# Patient Record
Sex: MALE | Race: WHITE | Employment: OTHER
[De-identification: names, ages, dates, MRNs, and addresses within clinical notes are randomized per-mention and may not be internally consistent; named-entity substitution may affect disease eponyms.]

---

## 2017-03-15 ENCOUNTER — TELEPHONE (OUTPATIENT)
Dept: CASE MANAGEMENT | Age: 67
End: 2017-03-15

## 2017-04-25 ENCOUNTER — TELEPHONE (OUTPATIENT)
Dept: CASE MANAGEMENT | Age: 67
End: 2017-04-25

## 2017-08-02 ENCOUNTER — TELEPHONE (OUTPATIENT)
Dept: CASE MANAGEMENT | Age: 67
End: 2017-08-02

## 2021-09-29 ENCOUNTER — HOSPITAL ENCOUNTER (INPATIENT)
Age: 71
LOS: 4 days | Discharge: HOME | DRG: 897 | End: 2021-10-04
Attending: EMERGENCY MEDICINE | Admitting: INTERNAL MEDICINE
Payer: MEDICARE

## 2021-09-29 ENCOUNTER — APPOINTMENT (OUTPATIENT)
Dept: CT IMAGING | Age: 71
DRG: 897 | End: 2021-09-29
Payer: MEDICARE

## 2021-09-29 ENCOUNTER — APPOINTMENT (OUTPATIENT)
Dept: GENERAL RADIOLOGY | Age: 71
DRG: 897 | End: 2021-09-29
Payer: MEDICARE

## 2021-09-29 DIAGNOSIS — S01.01XA LACERATION OF SCALP, INITIAL ENCOUNTER: ICD-10-CM

## 2021-09-29 DIAGNOSIS — E86.0 DEHYDRATION: ICD-10-CM

## 2021-09-29 DIAGNOSIS — M62.82 NON-TRAUMATIC RHABDOMYOLYSIS: Primary | ICD-10-CM

## 2021-09-29 LAB
ALBUMIN SERPL-MCNC: 3.7 G/DL (ref 3.4–5)
ALP BLD-CCNC: 134 U/L (ref 40–129)
ALT SERPL-CCNC: 44 U/L (ref 10–40)
ANION GAP SERPL CALCULATED.3IONS-SCNC: 21 MMOL/L (ref 3–16)
AST SERPL-CCNC: 101 U/L (ref 15–37)
BASOPHILS ABSOLUTE: 0 K/UL (ref 0–0.2)
BASOPHILS RELATIVE PERCENT: 0.1 %
BILIRUB SERPL-MCNC: 0.6 MG/DL (ref 0–1)
BILIRUBIN DIRECT: <0.2 MG/DL (ref 0–0.3)
BILIRUBIN, INDIRECT: ABNORMAL MG/DL (ref 0–1)
BUN BLDV-MCNC: 15 MG/DL (ref 7–20)
CALCIUM SERPL-MCNC: 9 MG/DL (ref 8.3–10.6)
CHLORIDE BLD-SCNC: 91 MMOL/L (ref 99–110)
CO2: 19 MMOL/L (ref 21–32)
CREAT SERPL-MCNC: 0.8 MG/DL (ref 0.8–1.3)
EOSINOPHILS ABSOLUTE: 0 K/UL (ref 0–0.6)
EOSINOPHILS RELATIVE PERCENT: 0.1 %
ETHANOL: NORMAL MG/DL (ref 0–0.08)
GFR AFRICAN AMERICAN: >60
GFR NON-AFRICAN AMERICAN: >60
GLUCOSE BLD-MCNC: 68 MG/DL (ref 70–99)
HCT VFR BLD CALC: 43.3 % (ref 40.5–52.5)
HEMOGLOBIN: 14.8 G/DL (ref 13.5–17.5)
INR BLD: 1.2 (ref 0.88–1.12)
LYMPHOCYTES ABSOLUTE: 0.5 K/UL (ref 1–5.1)
LYMPHOCYTES RELATIVE PERCENT: 3.3 %
MCH RBC QN AUTO: 34.3 PG (ref 26–34)
MCHC RBC AUTO-ENTMCNC: 34.2 G/DL (ref 31–36)
MCV RBC AUTO: 100 FL (ref 80–100)
MONOCYTES ABSOLUTE: 0.8 K/UL (ref 0–1.3)
MONOCYTES RELATIVE PERCENT: 4.8 %
NEUTROPHILS ABSOLUTE: 14.5 K/UL (ref 1.7–7.7)
NEUTROPHILS RELATIVE PERCENT: 91.7 %
PDW BLD-RTO: 13.1 % (ref 12.4–15.4)
PLATELET # BLD: 268 K/UL (ref 135–450)
PMV BLD AUTO: 7.2 FL (ref 5–10.5)
POTASSIUM REFLEX MAGNESIUM: 4.1 MMOL/L (ref 3.5–5.1)
PROTHROMBIN TIME: 13.7 SEC (ref 9.9–12.7)
RBC # BLD: 4.33 M/UL (ref 4.2–5.9)
SODIUM BLD-SCNC: 131 MMOL/L (ref 136–145)
TOTAL PROTEIN: 7 G/DL (ref 6.4–8.2)
WBC # BLD: 15.8 K/UL (ref 4–11)

## 2021-09-29 PROCEDURE — 36415 COLL VENOUS BLD VENIPUNCTURE: CPT

## 2021-09-29 PROCEDURE — 96360 HYDRATION IV INFUSION INIT: CPT

## 2021-09-29 PROCEDURE — 85025 COMPLETE CBC W/AUTO DIFF WBC: CPT

## 2021-09-29 PROCEDURE — 70450 CT HEAD/BRAIN W/O DYE: CPT

## 2021-09-29 PROCEDURE — 6360000002 HC RX W HCPCS: Performed by: PHYSICIAN ASSISTANT

## 2021-09-29 PROCEDURE — 72125 CT NECK SPINE W/O DYE: CPT

## 2021-09-29 PROCEDURE — 85610 PROTHROMBIN TIME: CPT

## 2021-09-29 PROCEDURE — 82550 ASSAY OF CK (CPK): CPT

## 2021-09-29 PROCEDURE — 80048 BASIC METABOLIC PNL TOTAL CA: CPT

## 2021-09-29 PROCEDURE — 2580000003 HC RX 258: Performed by: PHYSICIAN ASSISTANT

## 2021-09-29 PROCEDURE — 90471 IMMUNIZATION ADMIN: CPT | Performed by: PHYSICIAN ASSISTANT

## 2021-09-29 PROCEDURE — 96361 HYDRATE IV INFUSION ADD-ON: CPT

## 2021-09-29 PROCEDURE — 80076 HEPATIC FUNCTION PANEL: CPT

## 2021-09-29 PROCEDURE — 90715 TDAP VACCINE 7 YRS/> IM: CPT | Performed by: PHYSICIAN ASSISTANT

## 2021-09-29 PROCEDURE — 71046 X-RAY EXAM CHEST 2 VIEWS: CPT

## 2021-09-29 PROCEDURE — 82077 ASSAY SPEC XCP UR&BREATH IA: CPT

## 2021-09-29 RX ORDER — LIDOCAINE HYDROCHLORIDE AND EPINEPHRINE 10; 10 MG/ML; UG/ML
20 INJECTION, SOLUTION INFILTRATION; PERINEURAL ONCE
Status: COMPLETED | OUTPATIENT
Start: 2021-09-29 | End: 2021-09-30

## 2021-09-29 RX ORDER — SODIUM CHLORIDE, SODIUM LACTATE, POTASSIUM CHLORIDE, CALCIUM CHLORIDE 600; 310; 30; 20 MG/100ML; MG/100ML; MG/100ML; MG/100ML
1000 INJECTION, SOLUTION INTRAVENOUS ONCE
Status: COMPLETED | OUTPATIENT
Start: 2021-09-29 | End: 2021-09-30

## 2021-09-29 RX ADMIN — SODIUM CHLORIDE, POTASSIUM CHLORIDE, SODIUM LACTATE AND CALCIUM CHLORIDE 1000 ML: 600; 310; 30; 20 INJECTION, SOLUTION INTRAVENOUS at 22:58

## 2021-09-29 RX ADMIN — TETANUS TOXOID, REDUCED DIPHTHERIA TOXOID AND ACELLULAR PERTUSSIS VACCINE, ADSORBED 0.5 ML: 5; 2.5; 8; 8; 2.5 SUSPENSION INTRAMUSCULAR at 22:59

## 2021-09-30 PROBLEM — R41.82 AMS (ALTERED MENTAL STATUS): Status: ACTIVE | Noted: 2021-09-30

## 2021-09-30 LAB
BACTERIA: ABNORMAL /HPF
BILIRUBIN URINE: ABNORMAL
BLOOD, URINE: ABNORMAL
CLARITY: CLEAR
COLOR: YELLOW
GLUCOSE BLD-MCNC: 197 MG/DL (ref 70–99)
GLUCOSE URINE: NEGATIVE MG/DL
KETONES, URINE: >=80 MG/DL
LEUKOCYTE ESTERASE, URINE: ABNORMAL
MICROSCOPIC EXAMINATION: YES
NITRITE, URINE: NEGATIVE
PERFORMED ON: ABNORMAL
PH UA: 6 (ref 5–8)
PROTEIN UA: 30 MG/DL
RBC UA: ABNORMAL /HPF (ref 0–4)
SPECIFIC GRAVITY UA: >=1.03 (ref 1–1.03)
TOTAL CK: 1182 U/L (ref 39–308)
TOTAL CK: 1608 U/L (ref 39–308)
TOTAL CK: 2883 U/L (ref 39–308)
TOTAL CK: 886 U/L (ref 39–308)
URINE TYPE: ABNORMAL
UROBILINOGEN, URINE: 1 E.U./DL
WBC UA: ABNORMAL /HPF (ref 0–5)

## 2021-09-30 PROCEDURE — 94640 AIRWAY INHALATION TREATMENT: CPT

## 2021-09-30 PROCEDURE — 6360000002 HC RX W HCPCS: Performed by: PHYSICIAN ASSISTANT

## 2021-09-30 PROCEDURE — 6370000000 HC RX 637 (ALT 250 FOR IP): Performed by: INTERNAL MEDICINE

## 2021-09-30 PROCEDURE — 81003 URINALYSIS AUTO W/O SCOPE: CPT

## 2021-09-30 PROCEDURE — 87086 URINE CULTURE/COLONY COUNT: CPT

## 2021-09-30 PROCEDURE — 82550 ASSAY OF CK (CPK): CPT

## 2021-09-30 PROCEDURE — 2500000003 HC RX 250 WO HCPCS: Performed by: PHYSICIAN ASSISTANT

## 2021-09-30 PROCEDURE — 12002 RPR S/N/AX/GEN/TRNK2.6-7.5CM: CPT

## 2021-09-30 PROCEDURE — 6360000002 HC RX W HCPCS: Performed by: INTERNAL MEDICINE

## 2021-09-30 PROCEDURE — 0HQ0XZZ REPAIR SCALP SKIN, EXTERNAL APPROACH: ICD-10-PCS | Performed by: PHYSICIAN ASSISTANT

## 2021-09-30 PROCEDURE — 1200000000 HC SEMI PRIVATE

## 2021-09-30 PROCEDURE — 2580000003 HC RX 258: Performed by: PHYSICIAN ASSISTANT

## 2021-09-30 PROCEDURE — 2580000003 HC RX 258: Performed by: INTERNAL MEDICINE

## 2021-09-30 PROCEDURE — 2500000003 HC RX 250 WO HCPCS: Performed by: INTERNAL MEDICINE

## 2021-09-30 PROCEDURE — 36415 COLL VENOUS BLD VENIPUNCTURE: CPT

## 2021-09-30 PROCEDURE — 99285 EMERGENCY DEPT VISIT HI MDM: CPT

## 2021-09-30 RX ORDER — SODIUM CHLORIDE 9 MG/ML
25 INJECTION, SOLUTION INTRAVENOUS PRN
Status: DISCONTINUED | OUTPATIENT
Start: 2021-09-30 | End: 2021-10-04 | Stop reason: HOSPADM

## 2021-09-30 RX ORDER — LORAZEPAM 1 MG/1
2 TABLET ORAL
Status: DISCONTINUED | OUTPATIENT
Start: 2021-09-30 | End: 2021-10-04 | Stop reason: HOSPADM

## 2021-09-30 RX ORDER — LORAZEPAM 1 MG/1
1 TABLET ORAL
Status: DISCONTINUED | OUTPATIENT
Start: 2021-09-30 | End: 2021-10-04 | Stop reason: HOSPADM

## 2021-09-30 RX ORDER — LORAZEPAM 2 MG/ML
2 INJECTION INTRAMUSCULAR
Status: DISCONTINUED | OUTPATIENT
Start: 2021-09-30 | End: 2021-10-04 | Stop reason: HOSPADM

## 2021-09-30 RX ORDER — DEXTROSE, SODIUM CHLORIDE, SODIUM LACTATE, POTASSIUM CHLORIDE, AND CALCIUM CHLORIDE 5; .6; .31; .03; .02 G/100ML; G/100ML; G/100ML; G/100ML; G/100ML
1000 INJECTION, SOLUTION INTRAVENOUS CONTINUOUS
Status: DISCONTINUED | OUTPATIENT
Start: 2021-09-30 | End: 2021-10-03

## 2021-09-30 RX ORDER — LORAZEPAM 2 MG/ML
1 INJECTION INTRAMUSCULAR
Status: DISCONTINUED | OUTPATIENT
Start: 2021-09-30 | End: 2021-10-04 | Stop reason: HOSPADM

## 2021-09-30 RX ORDER — MAGNESIUM SULFATE IN WATER 40 MG/ML
2000 INJECTION, SOLUTION INTRAVENOUS PRN
Status: DISCONTINUED | OUTPATIENT
Start: 2021-09-30 | End: 2021-10-04 | Stop reason: HOSPADM

## 2021-09-30 RX ORDER — POLYETHYLENE GLYCOL 3350 17 G/17G
17 POWDER, FOR SOLUTION ORAL DAILY PRN
Status: DISCONTINUED | OUTPATIENT
Start: 2021-09-30 | End: 2021-10-04 | Stop reason: HOSPADM

## 2021-09-30 RX ORDER — LOPERAMIDE HYDROCHLORIDE 2 MG/1
2 CAPSULE ORAL 4 TIMES DAILY PRN
COMMUNITY

## 2021-09-30 RX ORDER — FOLIC ACID 1 MG/1
1 TABLET ORAL DAILY
Status: DISCONTINUED | OUTPATIENT
Start: 2021-10-01 | End: 2021-10-04 | Stop reason: HOSPADM

## 2021-09-30 RX ORDER — POTASSIUM CHLORIDE 7.45 MG/ML
10 INJECTION INTRAVENOUS PRN
Status: DISCONTINUED | OUTPATIENT
Start: 2021-09-30 | End: 2021-10-04 | Stop reason: HOSPADM

## 2021-09-30 RX ORDER — DEXTROSE MONOHYDRATE 25 G/50ML
12.5 INJECTION, SOLUTION INTRAVENOUS PRN
Status: DISCONTINUED | OUTPATIENT
Start: 2021-09-30 | End: 2021-10-04 | Stop reason: HOSPADM

## 2021-09-30 RX ORDER — POTASSIUM CHLORIDE 20 MEQ/1
40 TABLET, EXTENDED RELEASE ORAL PRN
Status: DISCONTINUED | OUTPATIENT
Start: 2021-09-30 | End: 2021-10-04 | Stop reason: HOSPADM

## 2021-09-30 RX ORDER — LORAZEPAM 2 MG/ML
3 INJECTION INTRAMUSCULAR
Status: DISCONTINUED | OUTPATIENT
Start: 2021-09-30 | End: 2021-10-04 | Stop reason: HOSPADM

## 2021-09-30 RX ORDER — ONDANSETRON 4 MG/1
4 TABLET, ORALLY DISINTEGRATING ORAL EVERY 8 HOURS PRN
Status: DISCONTINUED | OUTPATIENT
Start: 2021-09-30 | End: 2021-10-04 | Stop reason: HOSPADM

## 2021-09-30 RX ORDER — LORAZEPAM 2 MG/ML
4 INJECTION INTRAMUSCULAR
Status: DISCONTINUED | OUTPATIENT
Start: 2021-09-30 | End: 2021-10-04 | Stop reason: HOSPADM

## 2021-09-30 RX ORDER — GAUZE BANDAGE 2" X 2"
100 BANDAGE TOPICAL DAILY
Status: DISCONTINUED | OUTPATIENT
Start: 2021-10-01 | End: 2021-10-04 | Stop reason: HOSPADM

## 2021-09-30 RX ORDER — SODIUM CHLORIDE 0.9 % (FLUSH) 0.9 %
5-40 SYRINGE (ML) INJECTION EVERY 12 HOURS SCHEDULED
Status: DISCONTINUED | OUTPATIENT
Start: 2021-09-30 | End: 2021-10-04 | Stop reason: HOSPADM

## 2021-09-30 RX ORDER — SODIUM CHLORIDE 0.9 % (FLUSH) 0.9 %
5-40 SYRINGE (ML) INJECTION PRN
Status: DISCONTINUED | OUTPATIENT
Start: 2021-09-30 | End: 2021-10-04 | Stop reason: HOSPADM

## 2021-09-30 RX ORDER — TAMSULOSIN HYDROCHLORIDE 0.4 MG/1
0.4 CAPSULE ORAL DAILY
Status: ON HOLD | COMMUNITY
End: 2021-10-04 | Stop reason: SDUPTHER

## 2021-09-30 RX ORDER — ONDANSETRON 2 MG/ML
4 INJECTION INTRAMUSCULAR; INTRAVENOUS EVERY 6 HOURS PRN
Status: DISCONTINUED | OUTPATIENT
Start: 2021-09-30 | End: 2021-10-04 | Stop reason: HOSPADM

## 2021-09-30 RX ORDER — NICOTINE POLACRILEX 4 MG
15 LOZENGE BUCCAL PRN
Status: DISCONTINUED | OUTPATIENT
Start: 2021-09-30 | End: 2021-10-04 | Stop reason: HOSPADM

## 2021-09-30 RX ORDER — LORAZEPAM 1 MG/1
4 TABLET ORAL
Status: DISCONTINUED | OUTPATIENT
Start: 2021-09-30 | End: 2021-10-04 | Stop reason: HOSPADM

## 2021-09-30 RX ORDER — LORAZEPAM 1 MG/1
3 TABLET ORAL
Status: DISCONTINUED | OUTPATIENT
Start: 2021-09-30 | End: 2021-10-04 | Stop reason: HOSPADM

## 2021-09-30 RX ORDER — DEXTROSE MONOHYDRATE 50 MG/ML
100 INJECTION, SOLUTION INTRAVENOUS PRN
Status: DISCONTINUED | OUTPATIENT
Start: 2021-09-30 | End: 2021-10-04 | Stop reason: HOSPADM

## 2021-09-30 RX ORDER — MULTIVITAMIN WITH IRON
1 TABLET ORAL DAILY
Status: DISCONTINUED | OUTPATIENT
Start: 2021-10-01 | End: 2021-10-04 | Stop reason: HOSPADM

## 2021-09-30 RX ADMIN — FOLIC ACID: 5 INJECTION, SOLUTION INTRAMUSCULAR; INTRAVENOUS; SUBCUTANEOUS at 03:34

## 2021-09-30 RX ADMIN — SODIUM CHLORIDE, SODIUM LACTATE, POTASSIUM CHLORIDE, CALCIUM CHLORIDE AND DEXTROSE MONOHYDRATE 1000 ML: 5; 600; 310; 30; 20 INJECTION, SOLUTION INTRAVENOUS at 18:28

## 2021-09-30 RX ADMIN — LIDOCAINE HYDROCHLORIDE AND EPINEPHRINE 20 ML: 10; 10 INJECTION, SOLUTION INFILTRATION; PERINEURAL at 00:00

## 2021-09-30 RX ADMIN — SODIUM CHLORIDE, SODIUM LACTATE, POTASSIUM CHLORIDE, CALCIUM CHLORIDE AND DEXTROSE MONOHYDRATE 1000 ML: 5; 600; 310; 30; 20 INJECTION, SOLUTION INTRAVENOUS at 01:32

## 2021-09-30 RX ADMIN — LORAZEPAM 2 MG: 2 INJECTION INTRAMUSCULAR; INTRAVENOUS at 03:23

## 2021-09-30 RX ADMIN — LORAZEPAM 1 MG: 1 TABLET ORAL at 22:15

## 2021-09-30 RX ADMIN — CEFTRIAXONE 1000 MG: 1 INJECTION, POWDER, FOR SOLUTION INTRAMUSCULAR; INTRAVENOUS at 01:53

## 2021-09-30 ASSESSMENT — PAIN SCALES - GENERAL
PAINLEVEL_OUTOF10: 0

## 2021-09-30 NOTE — ED NOTES
Bed: B26-26  Expected date:   Expected time:   Means of arrival:   Comments:  [address], RN  09/29/21 2056

## 2021-09-30 NOTE — PLAN OF CARE
Patient seen and examined twice. Plan of care discussed with  Son  Improving CPK levels. Awaiting urine cultures.   Monitor for alcohol withdrawal.

## 2021-09-30 NOTE — ED PROVIDER NOTES
[address] [facility]          PHYSICIAN ASSISTANT NOTE       Date of evaluation: 9/29/2021    Chief Complaint     Fall (head lac)      History of Present Illness     Shirlene Page is a 66 y.o. male with PMH alcohol abuse reportedly quit in 1984, but son thinks he may have restarted within the past year, who presents after multiple falls. Patient states that he fell two days ago and hit his head. States he should have driven himself to the ED at that time, but he thought it would heal on its own. Thinks his tetanus vaccination is up to date. Patient states that he does not think he needs to be here and has no complaints. Says we should \"just put a bandaid on it and get me out of here. \"  Denies fevers, chest pain, trouble breathing, abdominal pain, nausea, vomiting. More history provided by son, who states he was called by police this evening regarding his father. States that he reportedly was found out in his he started half naked, had a shirt on but no other close and had fallen. Please officers reportedly got him up, but he refused transportation to the hospital, so they contacted son. Son then arrived with the police officers and found patient lying on the ground in his home with multiple beer cans and whiskey bottles surrounding him. Son states that patient is somewhat confused and is not acting himself. Son states that he thinks patient is likely intoxicated. Reports patient also had urinated on himself at home. Review of Systems     Review of Systems   See HPI, all others negative. Past Medical, Surgical, Family, and Social History     He has no past medical history on file. He has no past surgical history on file. His family history is not on file. He reports that he has quit smoking. He does not have any smokeless tobacco history on file.     Medications     Previous Medications    No medications on file       Allergies     He has No Known Results for orders placed or performed during the hospital encounter of 09/29/21   CBC Auto Differential   Result Value Ref Range    WBC 15.8 (H) 4.0 - 11.0 K/uL    RBC 4.33 4.20 - 5.90 M/uL    Hemoglobin 14.8 13.5 - 17.5 g/dL    Hematocrit 43.3 40.5 - 52.5 %    .0 80.0 - 100.0 fL    MCH 34.3 (H) 26.0 - 34.0 pg    MCHC 34.2 31.0 - 36.0 g/dL    RDW 13.1 12.4 - 15.4 %    Platelets 558 240 - 125 K/uL    MPV 7.2 5.0 - 10.5 fL    Neutrophils % 91.7 %    Lymphocytes % 3.3 %    Monocytes % 4.8 %    Eosinophils % 0.1 %    Basophils % 0.1 %    Neutrophils Absolute 14.5 (H) 1.7 - 7.7 K/uL    Lymphocytes Absolute 0.5 (L) 1.0 - 5.1 K/uL    Monocytes Absolute 0.8 0.0 - 1.3 K/uL    Eosinophils Absolute 0.0 0.0 - 0.6 K/uL    Basophils Absolute 0.0 0.0 - 0.2 K/uL   Basic Metabolic Panel w/ Reflex to MG   Result Value Ref Range    Sodium 131 (L) 136 - 145 mmol/L    Potassium reflex Magnesium 4.1 3.5 - 5.1 mmol/L    Chloride 91 (L) 99 - 110 mmol/L    CO2 19 (L) 21 - 32 mmol/L    Anion Gap 21 (H) 3 - 16    Glucose 68 (L) 70 - 99 mg/dL    BUN 15 7 - 20 mg/dL    CREATININE 0.8 0.8 - 1.3 mg/dL    GFR Non-African American >60 >60    GFR African American >60 >60    Calcium 9.0 8.3 - 10.6 mg/dL   Hepatic Function Panel   Result Value Ref Range    Total Protein 7.0 6.4 - 8.2 g/dL    Albumin 3.7 3.4 - 5.0 g/dL    Alkaline Phosphatase 134 (H) 40 - 129 U/L    ALT 44 (H) 10 - 40 U/L     (H) 15 - 37 U/L    Total Bilirubin 0.6 0.0 - 1.0 mg/dL    Bilirubin, Direct <0.2 0.0 - 0.3 mg/dL    Bilirubin, Indirect see below 0.0 - 1.0 mg/dL   Protime-INR   Result Value Ref Range    Protime 13.7 (H) 9.9 - 12.7 sec    INR 1.20 (H) 0.88 - 1.12   Ethanol   Result Value Ref Range    Ethanol Lvl None Detected mg/dL   Urinalysis, reflex to microscopic   Result Value Ref Range    Color, UA Yellow Straw/Yellow    Clarity, UA Clear Clear    Glucose, Ur Negative Negative mg/dL    Bilirubin Urine MODERATE (A) Negative    Ketones, Urine >=80 (A) Negative mg/dL    Specific Gravity, UA >=1.030 1.005 - 1.030    Blood, Urine LARGE (A) Negative    pH, UA 6.0 5.0 - 8.0    Protein, UA 30 (A) Negative mg/dL    Urobilinogen, Urine 1.0 <2.0 E.U./dL    Nitrite, Urine Negative Negative    Leukocyte Esterase, Urine MODERATE (A) Negative    Microscopic Examination YES     Urine Type Voided    CK (Lab)   Result Value Ref Range    Total CK 2,883 (H) 39 - 308 U/L       RECENT VITALS:  BP: 120/82, Temp: 98.6 °F (37 °C), Pulse: 96, Resp: 16, SpO2: 100 %     Procedures     Lac Repair    Date/Time: 9/30/2021 12:21 AM  Performed by: Kiko Ragland PA-C  Authorized by: Kate Ellis MD     Consent:     Consent obtained:  Verbal    Consent given by:  Patient    Risks discussed:  Infection and pain    Alternatives discussed:  No treatment and observation  Universal protocol:     Procedure explained and questions answered to patient or proxy's satisfaction: yes      Patient identity confirmed:  Verbally with patient and arm band  Anesthesia (see MAR for exact dosages): Anesthesia method:  Local infiltration    Local anesthetic:  Lidocaine 1% WITH epi  Laceration details:     Location:  Scalp    Scalp location:  Crown    Length (cm):  5    Depth (mm):  10  Repair type:     Repair type:  Simple  Pre-procedure details:     Preparation:  Patient was prepped and draped in usual sterile fashion and imaging obtained to evaluate for foreign bodies  Treatment:     Area cleansed with:  Saline    Amount of cleaning:  Extensive    Irrigation solution:  Sterile saline    Irrigation method:  Syringe  Skin repair:     Repair method:  Sutures    Suture size:  4-0    Suture material:  Chromic gut    Suture technique:  Simple interrupted    Number of sutures:  3  Approximation:     Approximation:  Loose  Post-procedure details:     Dressing:  Open (no dressing)    Patient tolerance of procedure:   Tolerated well, no immediate complications          ED Course     Nursing Notes, Past Medical Hx,Past Surgical Hx, Social Hx, Allergies, and Family Hx were reviewed. The patient was given the following medications:  Orders Placed This Encounter   Medications    Tetanus-Diphth-Acell Pertussis (BOOSTRIX) injection 0.5 mL    lidocaine-EPINEPHrine 1 %-1:[id number] injection 20 mL    lactated ringers infusion 1,000 mL       CONSULTS:  Cindy 26 / ASSESSMENT / Lakshmi Marybel is a 66 y.o. male presenting via EMS for scalp laceration and confusion. Patient does appear poorly groomed and slightly disheveled, but vitals are largely within normal limits with exception of minimal tachycardia. Patient does have a 5 cm vertical laceration to posterior aspect of scalp that does appear old and is reportedly 3days old. Tetanus was updated today, as cannot find recent tetanus vaccination in chart review and patient is unsure of tetanus status. Wound was thoroughly irrigated and slightly approximated as described in detail and procedure description above. Given patient's age, obvious head injury, and confusion, CT head and CT C-spine were ordered and showed no acute intracranial abnormality. Chest x-ray was ordered despite no signs of respiratory distress or complaints of chest pain and do show fractures of left fourth through seventh ribs. Incentive spirometry was ordered. Lab work was obtained including CBC which shows leukocytosis of 15,800. BMP shows hyponatremia of 131, low blood sugar of 68, and elevated anion gap of 21. PT/INR shows an elevation of 1.20. LFTs show elevation of alk phos, AST, and ALT. CK was added on and is elevated at 2800. UA shows moderate leuks, 80 ketones, and bilirubin and blood. Microscopic pending. Urine culture was sent. Patient will be admitted for ongoing management. This patient was also evaluated by the attending physician. All care plans were discussed and agreed upon. Clinical Impression     1. Non-traumatic rhabdomyolysis    2. Laceration of scalp, initial encounter    3. Dehydration        Disposition     PATIENT REFERRED TO:  No follow-up provider specified.     DISCHARGE MEDICATIONS:  New Prescriptions    No medications on file       [id number] Ismael Velasco PA-C  09/30/21 0123

## 2021-09-30 NOTE — PROGRESS NOTES
Patient admitted to room 5528. Patient is alert and oriented. Vital signs are stable. Patient very anxious and fidgety. Patient on camera. Bed in lowest position. Bed alarm is activated. Call light is within reach. Will continue to monitor and reassess.

## 2021-09-30 NOTE — PROGRESS NOTES
Pt is A/O x4 with some intermittent confusion. VSS. Pt denies pain. Pt is groggy and sleeping on and off. Incontinent at times. Up x1-2 with gait belt. Pt is unsteady on his feet, states he feels his body is heavy. Fall precautions in place. Camera on. Will continue to monitor.

## 2021-09-30 NOTE — H&P
Hospital Medicine History & Physical      PCP: Malcolm Juarez MD    Date of Admission: 9/29/2021    Date of Service: Pt seen/examined on 9/30/2021 and Admitted to Inpatient with expected LOS greater than two midnights due to medical therapy. Chief Complaint: Altered mental state      History Of Present Illness:      66 y.o. male who presents with his son after multiple falls. Most history is provided by the son, who stated that he was called by the police yesterday evening regarding his father. Patient was found on the ground, half naked wearing only a shirt. Police officers has assisted him and suggested transportation to the hospital which patient has refused. Then son was contacted. When son arrived at home patient was found lying on the ground with multiple beer cans and whiskey bottles surrounding him. Patient seemed confused and was acting weird and has urinated on himself. Patient has history of alcohol abuse, according to the son has quit in 1984. But son thinks his father must have started drinking again over the past year leading him to have multiple falls. Patient states that he fell a couple of days ago when struck his head. Does not think that he needs to be hospitalized. Currently denies any complaints. Past Medical History:    Unable to obtain due to altered mental state  No past medical history on file. Past Surgical History:      No past surgical history on file. Unable to obtain due to altered mental state    Medications Prior to Admission:      Prior to Admission medications    Not on File   Unable to obtain due to altered mental state    Allergies:  Patient has no known allergies. Social History:    TOBACCO:   reports that he has quit smoking. He does not have any smokeless tobacco history on file. ETOH:   has no history on file for alcohol use.   E-Cigarettes/Vaping Use     Questions Responses    E-Cigarette/Vaping Use     Start Date     Passive Exposure Quit Date     Counseling Given     Comments           Family History:    Unable to obtain due to altered mental state    No family history on file. REVIEW OF SYSTEMS COMPLETED:   Pertinent positives as noted in the HPI. All other systems reviewed and negative. PHYSICAL EXAM PERFORMED:    /82   Pulse 96   Temp 98.6 °F (37 °C) (Oral)   Resp 16   SpO2 100%     General appearance:  No apparent distress, appears older than stated age. Disheveled appearing. HEENT:  Normal cephalic, atraumatic without obvious deformity. Pupils equal, round, and reactive to light. Extra ocular muscles intact. Conjunctivae/corneas clear. Left sided posterior scalp laceration-old appearing and status post suturing  Neck: Supple, with full range of motion. No jugular venous distention. Trachea midline. Respiratory:  Normal respiratory effort. Clear to auscultation, bilaterally without Rales/Wheezes/Rhonchi. Cardiovascular: Tachycardic  rate and rhythm with normal S1/S2 without murmurs, rubs or gallops. Abdomen: Soft, non-tender, non-distended with normal bowel sounds. Musculoskeletal:  No clubbing, cyanosis or edema bilaterally. Full range of motion without deformity. ROM-normal  Skin: Skin color, texture, turgor normal.  Multiple abrasions and contusions +, skin tear-left forearm  Neurologic:  Neurovascularly intact without any focal sensory/motor deficits. Cranial nerves: II-XII intact, grossly non-focal.  Moving all extremities spontaneously  Psychiatric:  Alert and oriented to self only.   Capillary Refill: Brisk,3 seconds, normal  Peripheral Pulses: +2 palpable, equal bilaterally       Labs:     Recent Labs     09/29/21 2155   WBC 15.8*   HGB 14.8   HCT 43.3        Recent Labs     09/29/21 2155   *   K 4.1   CL 91*   CO2 19*   BUN 15   CREATININE 0.8   CALCIUM 9.0     Recent Labs     09/29/21 2155   *   ALT 44*   BILIDIR <0.2   BILITOT 0.6   ALKPHOS 134*     Recent Labs     09/29/21 2224 INR 1.20*     Recent Labs     09/29/21  2159   CKTOTAL 2,883*       Urinalysis:      Lab Results   Component Value Date    NITRU Negative 09/30/2021    WBCUA  09/30/2021    BACTERIA 3+ 09/30/2021    RBCUA 3-4 09/30/2021    BLOODU LARGE 09/30/2021    SPECGRAV >=1.030 09/30/2021    GLUCOSEU Negative 09/30/2021       Radiology:   EKG:  I have reviewed the EKG with the following interpretation: N/A    CT Cervical Spine WO Contrast   Final Result      1. No acute intracranial process. Left parieto-occipital scalp swelling. 2.  No evidence of acute fracture in the cervical spine. Moderate to severe cervical spondylosis. CT Head WO Contrast   Final Result      1. No acute intracranial process. Left parieto-occipital scalp swelling. 2.  No evidence of acute fracture in the cervical spine. Moderate to severe cervical spondylosis. XR CHEST (2 VW)   Final Result      Mildly displaced right fourth through seventh lateral rib fractures. No acute pulmonary disease.              ASSESSMENT:    Active Hospital Problems    Diagnosis Date Noted    AMS (altered mental status) [R41.82] 09/30/2021   #Altered mental state-multifactorial: UTI versus hypoglycemia versus alcohol withdrawal versus dementia related to alcohol use  #Sepsis (leukocytosis, tachycardia) secondary to UTI  #Multiple falls, CT head with no acute ICH  #Left periatrial occipital scalp swelling and laceration  #Rhabdomyolysis-could be secondary to multiple falls versus EtOH abuse  #Elevated LFTs-alcoholic +/-secondary to rhabdo moniliasis  #Alcohol abuse  #Multiple rib fractures-right 4-seventh lateral rib fractures  #Hypoglycemia-likely secondary to poor oral intake/nutrition related to chronic alcohol use    PLAN:  -Rally pack x1  -Thiamine, folic acid, multivitamins daily  -Continue on ceftriaxone, follow urine cultures  -Monitor LFTs  -Wound care  -IV hydration  -Monitor CK every 8 hourly  -Monitor electrolytes and renal function closely  -CIWA protocol with Ativan  -Pain relief, incentive spirometry  -Hypoglycemia protocol, check blood glucose 4 times daily for 24 hours  -Fall and seizure precautions  -PT/OT  -Social service consult      DVT Prophylaxis: Lovenox  Diet: ADULT DIET; Regular  Code Status: Full Code    PT/OT Eval Status: As tolerated with fall precautions    Dispo -GMF with telemetry       Glenn Huff MD    Thank you Graeme Jennings MD for the opportunity to be involved in this patient's care. If you have any questions or concerns please feel free to contact me at [phone].

## 2021-09-30 NOTE — PLAN OF CARE
Problem: Falls - Risk of:  Goal: Will remain free from falls  Description: Will remain free from falls  Outcome: Ongoing   Patient remains free from falls during this shift. Bed is in the lowest position and the bed alarm is activated. Anti-slip socks are on. Call light is within reach. Will continue to monitor and reassess. Problem: Confusion - Acute:  Goal: Absence of continued neurological deterioration signs and symptoms  Description: Absence of continued neurological deterioration signs and symptoms  Outcome: Ongoing   Patient is alert and oriented. CIWA 14. No changes in neuro assessment thus far this shift.

## 2021-09-30 NOTE — PROGRESS NOTES
4 Eyes Admission Assessment     I agree as the admission nurse that 2 RN's have performed a thorough Head to Toe Skin Assessment on the patient. ALL assessment sites listed below have been assessed on admission. Areas assessed by both nurses:   [x]   Head, Face, and Ears   [x]   Shoulders, Back, and Chest  [x]   Arms, Elbows, and Hands   [x]   Coccyx, Sacrum, and Ischium  [x]   Legs, Feet, and Heels        Does the Patient have Skin Breakdown? Patient has scattered abrasions and bruising, a laceration on the back of his head, a skin tear on his left forearm, and blanchable redness on his buttocks.         Edwin Prevention initiated:  No   Wound Care Orders initiated:  No      [facility] nurse consulted for Pressure Injury (Stage 3,4, Unstageable, DTI, NWPT, and Complex wounds) or Edwin score 18 or lower:  No      Nurse 1 eSignature: Electronically signed by Chinedu Smith RN on 9/30/21 at 6:34 AM EDT    **SHARE this note so that the co-signing nurse is able to place an eSignature**    Nurse 2 eSignature: Electronically signed by Praneeth Mensah RN on 9/30/21 at 6:39 AM EDT

## 2021-09-30 NOTE — ED PROVIDER NOTES
ED Attending Attestation Note     Date of evaluation: 9/29/2021    This patient was seen by the ALEXANDR. I have seen and examined the patient, agree with the workup, evaluation, management and diagnosis. The care plan has been discussed. I have reviewed the ECG and concur with the ALEXANDR's interpretation. I was present for any procedures performed in the ALEXANDR's note and have made edits to the note where appropriate. My assessment reveals 66 y.o. male with history of alcohol abuse presenting to the emergency department today for multiple falls and a head laceration. On my examination he is well-appearing but does have a large vertical left occipital laceration that appears to be partially healed. It is somewhat gaping and we will close it loosely after thorough irrigation, please see the ALEXANDR's note for details. He otherwise has no evidence of external trauma. Will obtain labs and CT imaging of the head and C-spine.           Flavio Orantes MD  09/29/21 2754

## 2021-09-30 NOTE — PLAN OF CARE
Problem: Falls - Risk of:  Goal: Will remain free from falls  Description: Will remain free from falls  9/30/2021 1357 by Antoinette Stearns RN  Outcome: Ongoing  Note: Pt is in bed with alarm on. Non-skid socks are on. Up x1-2 with gait belt, pt is unsteady on his feet. Fall precautions in place. Camera on for safety. Call light and bedside table in reach. Problem: Confusion - Acute:  Goal: Absence of continued neurological deterioration signs and symptoms  Description: Absence of continued neurological deterioration signs and symptoms  9/30/2021 1357 by Antoinette Stearns RN  Outcome: Ongoing  Note: Pt is A/O x4. Pt seems confused and is not at baseline per son. Answering questions appropriately. Will continue to monitor.

## 2021-10-01 LAB
A/G RATIO: 1 (ref 1.1–2.2)
ALBUMIN SERPL-MCNC: 2.7 G/DL (ref 3.4–5)
ALP BLD-CCNC: 103 U/L (ref 40–129)
ALT SERPL-CCNC: 40 U/L (ref 10–40)
ANION GAP SERPL CALCULATED.3IONS-SCNC: 11 MMOL/L (ref 3–16)
AST SERPL-CCNC: 68 U/L (ref 15–37)
BASOPHILS ABSOLUTE: 0 K/UL (ref 0–0.2)
BASOPHILS RELATIVE PERCENT: 0.1 %
BILIRUB SERPL-MCNC: <0.2 MG/DL (ref 0–1)
BUN BLDV-MCNC: 7 MG/DL (ref 7–20)
CALCIUM SERPL-MCNC: 8.1 MG/DL (ref 8.3–10.6)
CHLORIDE BLD-SCNC: 100 MMOL/L (ref 99–110)
CO2: 25 MMOL/L (ref 21–32)
CREAT SERPL-MCNC: 0.6 MG/DL (ref 0.8–1.3)
EOSINOPHILS ABSOLUTE: 0.1 K/UL (ref 0–0.6)
EOSINOPHILS RELATIVE PERCENT: 1.3 %
GFR AFRICAN AMERICAN: >60
GFR NON-AFRICAN AMERICAN: >60
GLOBULIN: 2.7 G/DL
GLUCOSE BLD-MCNC: 103 MG/DL (ref 70–99)
HCT VFR BLD CALC: 34.8 % (ref 40.5–52.5)
HEMOGLOBIN: 12 G/DL (ref 13.5–17.5)
LYMPHOCYTES ABSOLUTE: 0.8 K/UL (ref 1–5.1)
LYMPHOCYTES RELATIVE PERCENT: 7.9 %
MAGNESIUM: 1.9 MG/DL (ref 1.8–2.4)
MCH RBC QN AUTO: 34.3 PG (ref 26–34)
MCHC RBC AUTO-ENTMCNC: 34.3 G/DL (ref 31–36)
MCV RBC AUTO: 99.8 FL (ref 80–100)
MONOCYTES ABSOLUTE: 0.6 K/UL (ref 0–1.3)
MONOCYTES RELATIVE PERCENT: 6 %
NEUTROPHILS ABSOLUTE: 8.3 K/UL (ref 1.7–7.7)
NEUTROPHILS RELATIVE PERCENT: 84.7 %
PDW BLD-RTO: 13.2 % (ref 12.4–15.4)
PLATELET # BLD: 194 K/UL (ref 135–450)
PMV BLD AUTO: 6.9 FL (ref 5–10.5)
POTASSIUM REFLEX MAGNESIUM: 2.9 MMOL/L (ref 3.5–5.1)
RBC # BLD: 3.49 M/UL (ref 4.2–5.9)
SODIUM BLD-SCNC: 136 MMOL/L (ref 136–145)
TOTAL PROTEIN: 5.4 G/DL (ref 6.4–8.2)
URINE CULTURE, ROUTINE: NORMAL
WBC # BLD: 9.8 K/UL (ref 4–11)

## 2021-10-01 PROCEDURE — 97535 SELF CARE MNGMENT TRAINING: CPT

## 2021-10-01 PROCEDURE — 97162 PT EVAL MOD COMPLEX 30 MIN: CPT

## 2021-10-01 PROCEDURE — 2580000003 HC RX 258: Performed by: INTERNAL MEDICINE

## 2021-10-01 PROCEDURE — 80053 COMPREHEN METABOLIC PANEL: CPT

## 2021-10-01 PROCEDURE — 83735 ASSAY OF MAGNESIUM: CPT

## 2021-10-01 PROCEDURE — 97530 THERAPEUTIC ACTIVITIES: CPT

## 2021-10-01 PROCEDURE — 97116 GAIT TRAINING THERAPY: CPT

## 2021-10-01 PROCEDURE — 6360000002 HC RX W HCPCS: Performed by: INTERNAL MEDICINE

## 2021-10-01 PROCEDURE — 36415 COLL VENOUS BLD VENIPUNCTURE: CPT

## 2021-10-01 PROCEDURE — 6370000000 HC RX 637 (ALT 250 FOR IP): Performed by: INTERNAL MEDICINE

## 2021-10-01 PROCEDURE — 94664 DEMO&/EVAL PT USE INHALER: CPT

## 2021-10-01 PROCEDURE — 97166 OT EVAL MOD COMPLEX 45 MIN: CPT

## 2021-10-01 PROCEDURE — 1200000000 HC SEMI PRIVATE

## 2021-10-01 PROCEDURE — 94150 VITAL CAPACITY TEST: CPT

## 2021-10-01 PROCEDURE — 85025 COMPLETE CBC W/AUTO DIFF WBC: CPT

## 2021-10-01 RX ORDER — POTASSIUM CHLORIDE 7.45 MG/ML
10 INJECTION INTRAVENOUS
Status: COMPLETED | OUTPATIENT
Start: 2021-10-01 | End: 2021-10-01

## 2021-10-01 RX ORDER — TAMSULOSIN HYDROCHLORIDE 0.4 MG/1
0.4 CAPSULE ORAL DAILY
Status: DISCONTINUED | OUTPATIENT
Start: 2021-10-01 | End: 2021-10-04 | Stop reason: HOSPADM

## 2021-10-01 RX ORDER — POTASSIUM CHLORIDE 20 MEQ/1
40 TABLET, EXTENDED RELEASE ORAL
Status: DISCONTINUED | OUTPATIENT
Start: 2021-10-01 | End: 2021-10-01

## 2021-10-01 RX ADMIN — Medication 10 ML: at 21:35

## 2021-10-01 RX ADMIN — TAMSULOSIN HYDROCHLORIDE 0.4 MG: 0.4 CAPSULE ORAL at 09:45

## 2021-10-01 RX ADMIN — SODIUM CHLORIDE, SODIUM LACTATE, POTASSIUM CHLORIDE, CALCIUM CHLORIDE AND DEXTROSE MONOHYDRATE 1000 ML: 5; 600; 310; 30; 20 INJECTION, SOLUTION INTRAVENOUS at 07:00

## 2021-10-01 RX ADMIN — POTASSIUM CHLORIDE 10 MEQ: 7.45 INJECTION INTRAVENOUS at 19:26

## 2021-10-01 RX ADMIN — THERA TABS 1 TABLET: TAB at 09:45

## 2021-10-01 RX ADMIN — LORAZEPAM 1 MG: 1 TABLET ORAL at 21:31

## 2021-10-01 RX ADMIN — POTASSIUM CHLORIDE 10 MEQ: 7.45 INJECTION INTRAVENOUS at 12:18

## 2021-10-01 RX ADMIN — SODIUM CHLORIDE 500 ML: 9 INJECTION, SOLUTION INTRAVENOUS at 20:17

## 2021-10-01 RX ADMIN — POTASSIUM CHLORIDE 10 MEQ: 7.45 INJECTION INTRAVENOUS at 18:17

## 2021-10-01 RX ADMIN — POTASSIUM CHLORIDE 10 MEQ: 7.45 INJECTION INTRAVENOUS at 11:11

## 2021-10-01 RX ADMIN — Medication 100 MG: at 09:45

## 2021-10-01 RX ADMIN — POTASSIUM CHLORIDE 10 MEQ: 7.45 INJECTION INTRAVENOUS at 15:52

## 2021-10-01 RX ADMIN — POTASSIUM CHLORIDE 10 MEQ: 7.45 INJECTION INTRAVENOUS at 21:33

## 2021-10-01 RX ADMIN — CEFTRIAXONE 1000 MG: 1 INJECTION, POWDER, FOR SOLUTION INTRAMUSCULAR; INTRAVENOUS at 02:00

## 2021-10-01 RX ADMIN — ENOXAPARIN SODIUM 40 MG: 40 INJECTION SUBCUTANEOUS at 09:45

## 2021-10-01 RX ADMIN — FOLIC ACID 1 MG: 1 TABLET ORAL at 09:45

## 2021-10-01 NOTE — PROGRESS NOTES
Pt is A/O x4 with some confusion. Answers questions appropriately but is forgetful. VSS. Neuro checks remain unchanged. Up x1 with gait belt, pt can be very unsteady on his feet. Fall precautions in place. Will continue to monitor.

## 2021-10-01 NOTE — PROGRESS NOTES
Occupational Therapy   Occupational Therapy Initial Assessment and Treatment note   Date: 10/1/2021   Patient Name: Christi Cunningham  MRN: [id number]     : 1950    Date of Service: 10/1/2021    Discharge Recommendations:Rodney Gallardo scored a 22/24 on the AM-PAC ADL Inpatient form. Current research shows that an AM-PAC score of 18 or greater is typically associated with a discharge to the patient's home setting. Based on the patient's AM-PAC score, and their current ADL deficits, it is recommended that the patient have 2-3 sessions per week of Occupational Therapy at d/c to increase the patient's independence. At this time, this patient demonstrates the endurance and safety to discharge home with [address] [place] [address]  and a follow up treatment frequency of 2-3x/wk. Please see assessment section for further patient specific details. If patient discharges prior to next session this note will serve as a discharge summary. Please see below for the latest assessment towards goals. OT Equipment Recommendations  Equipment Needed: Yes  Other: shower chair -- pt report will obtain on own    Assessment   Performance deficits / Impairments: Decreased safe awareness;Decreased high-level IADLs  Assessment: Pt from home alone. Pt demo CGA/ supervision for ADLs/ functional mobility and transfers. Pt defensive regarding hospital stay and denies falls / drinking. Pt edu on home safety / use of shower chair and sitting for LE ADLs. Pt verb understanding but ? carryover. Pt would benefit from home safety eval / DME recommendations at d/c. Will follow as inpt 1-2 prn. Treatment Diagnosis: impaired IADLs/ functional endurance / safety 2/2 ETOH / fall  Prognosis: Fair  Decision Making: Medium Complexity  OT Education: OT Role;Plan of Care;IADL Safety; ADL Adaptive Strategies  Patient Education: verb understanding - reinforce as needed  REQUIRES OT FOLLOW UP: Yes  Activity Tolerance  Activity Tolerance: Patient Tolerated treatment well  Activity Tolerance: no c/o pain. no HENSLEY noted. Safety Devices  Safety Devices in place: Yes  Type of devices: Call light within reach;Nurse notified; Left in chair;Chair alarm in place           Patient Diagnosis(es): The primary encounter diagnosis was Non-traumatic rhabdomyolysis. Diagnoses of Laceration of scalp, initial encounter and Dehydration were also pertinent to this visit. has no past medical history on file. has no past surgical history on file. Treatment Diagnosis: impaired IADLs/ functional endurance / safety 2/2 ETOH / fall      Restrictions  Position Activity Restriction  Other position/activity restrictions: Up with assist    Subjective   General  Chart Reviewed: Yes  Additional Pertinent Hx: Admit 9/29  Family / Caregiver Present: No  Subjective  Subjective: \" I feel fine\" pt found resting in bed. Agreeable for OOB/OT eval and tx. Vital Signs  Level of Consciousness: Alert (0)    Social/Functional History  Social/Functional History  Lives With: Alone  Type of Home: House (condo)  Home Layout: Two level, Able to Live on Main level with bedroom/bathroom  Home Access: Stairs to enter with rails  Entrance Stairs - Number of Steps: 7+7  Bathroom Shower/Tub: Tub/Shower unit, Tub only  Bathroom Toilet: Standard  Home Equipment: [employer]  ADL Assistance: Independent  Homemaking Assistance: Independent  Ambulation Assistance: Independent  Transfer Assistance: Independent  Active : Yes  Occupation: Retired  Type of occupation: [employer]  Additional Comments: Pt reports independent with all self care / IADLs. Objective  Treatment included functional transfer training, ADL's and pt. education.        Orientation  Overall Orientation Status: Within Functional Limits     Balance  Sitting Balance: Independent  Standing Balance: Supervision  Standing Balance  Time: 3 min's x 2 and 4 mins x 2  Activity: functional transfers/ stance at sink /functional mobility in room / bathroom Stephania Navarro  Functional Mobility  Functional - Mobility Device: No device  Activity: Other  Assist Level: Supervision  Functional Mobility Comments: Pt distracted with RW in hallway  Toilet Transfers  Toilet - Technique: Ambulating  Equipment Used: Standard toilet  Toilet Transfer: Independent  Toilet Transfers Comments: on/off commode x 2 -- Pt reports has sink if needed  Shower Transfers  Shower Transfers Comments: pt edu on having family present in home with showering and importance of using shower chair for safety- pt verb understanding. Pt reports will get shower chair on his own  ADL  Feeding: Setup  Grooming: Setup (stance at sink to brush teeth)  LE Dressing: Modified independent   Toileting: Modified independent   Additional Comments: Pt edu on importance of sitting for LE ADLs and showering --pt edu on how to obtain shower chair. Tone RUE  RUE Tone: Normotonic  Tone LUE  LUE Tone: Normotonic  Coordination  Movements Are Fluid And Coordinated: No  Quality of Movement Other  Comment: Limited functional use of LUE 2/2 RTC injury     Bed mobility  Supine to Sit: Contact guard assistance (pulling up from therapist's hand)  Transfers  Sit to stand: Supervision  Stand to sit: Supervision  Transfer Comments: pt edu on safe tech - verb understanding  Vision - Basic Assessment  Prior Vision: Wears glasses only for reading  Vision Comments: \" I have about 8 pairs of cheaters at home \"  Cognition  Overall Cognitive Status: [facility]  Cognition Comment: poor insight into deficits / poor awareness with drinking etc.    LUE AROM (degrees)  LUE AROM : WFL  Left Hand AROM (degrees)  Left Hand AROM: WFL  RUE AROM (degrees)  RUE AROM : Exceptions  RUE General AROM: limited at shoulder flexion / Abduction 0-80 2/2 RTC issues.   Right Hand AROM (degrees)  Right Hand AROM: WFL  LUE Strength  Gross LUE Strength: WFL  L Hand General: 4/5  RUE Strength  Gross RUE Strength: WFL  R Hand General: 4/5     Plan   Plan  Times per week: 2-5x  Times per day: Daily  Current Treatment Recommendations: Functional Mobility Training, Safety Education & Training, Self-Care / ADL, Patient/Caregiver Education & Training, Equipment Evaluation, Education, & procurement    AM-PAC Score  AM-PAC Inpatient Daily Activity Raw Score: 22 (10/01/21 1225)  AM-PAC Inpatient ADL T-Scale Score : 47.1 (10/01/21 1225)  ADL Inpatient CMS 0-100% Score: 25.8 (10/01/21 1225)  ADL Inpatient CMS G-Code Modifier : Yesi Louis (10/01/21 1225)    Goals  Short term goals  Time Frame for Short term goals: at d/c  Short term goal 1: Verb 2  to use at home  Short term goal 2: Chair push ups x 5 reps x 2 sets with supervision  Short term goal 3: Stance x 10 mins with Mod independence for ADLs / item retrieval     Therapy Time   Individual Concurrent Group Co-treatment   Time In 1130         Time Out 1210         Minutes 40              Timed Code Treatment Minutes:   25 mins     Total Treatment Minutes:  40 mins       Maxx Sandy OT

## 2021-10-01 NOTE — CARE COORDINATION
Case Management Daily Note                    Date: 10/1/2021     Patient Name: Armando Austin    Date of Admission: 9/29/2021  8:56 PM  YOB: 1950    Length of Stay: 1  GMLOS: 3.5         Patient Admission Status: Inpatient  Diagnosis:Dehydration [E86.0]  Laceration of scalp, initial encounter [S01.01XA]  Non-traumatic rhabdomyolysis [M62.82]  AMS (altered mental status) [R41.82]     ________________________________________________________________________________________  Discharge Plan: Home  Son for transport     Insurance: Payor: Marques Wilks / Plan: Lory Benítez PPO / Product Type: Medicare /   Is pre-cert/notification needed: n/a     Tentative discharge date: 10/2     Current barriers: none     Referrals completed: Not Applicable    Resources/ information provided: Not indicated at this time   ________________________________________________________________________________________  PT AM-PAC: 18 / 24 per last evaluation on: 10/1    OT AM-PAC: 22 / 24 per last evaluation on: 10/1     DME Needs for discharge: defer  ________________________________________________________________________________________  Notes/Plan of Care:   SW met with patient at bedside. Patient was alert and orientated with SW. Patient resides in his own home. He reported he plans to go home at discharge. He denied any SW needs. He stated he has access to cane/walker and any other DME if needed. Patient did not wish to discuss drinking or EtOH use. Patient reported his son would be his transport home.        Care Transition Patient: PHILIP Buckner  [facility]   Case Management Department  Ph: [phone]

## 2021-10-01 NOTE — PROGRESS NOTES
Pt is alert and oriented. VSS. Pt asking for medication to sleep that he received the night before. Pt score for CIWA which the an 9. Pt had tremors and agitation. 1 mg PO Ativan given. Wanted IV this nurse let the pt know that this how we are going to start first. Pt verbalized understanding. Pt currently resting. Will continue to monitor.

## 2021-10-01 NOTE — PROGRESS NOTES
Physical Therapy    Facility/Department: [facility] 5T ORTHO/NEURO  Initial Assessment and Treatment    NAME: Rodrigo Borrero  : 1950  MRN: [id number]    Date of Service: 10/1/2021    Discharge Recommendations:  Rodrigo Borrero scored a 18/24 on the AM-PAC short mobility form. Current research shows that an AM-PAC score of 18 or greater is typically associated with a discharge to the patient's home setting. Based on the patient's AM-PAC score and their current functional mobility deficits, it is recommended that the patient have 2-3 sessions per week of Physical Therapy at d/c to increase the patient's independence. At this time, this patient demonstrates the endurance and safety to discharge home. Please see assessment section for further patient specific details. PT Equipment Recommendations  Equipment Needed: No    Assessment   Assessment: Pt from home with multiple falls and history of alcohol abuse. Pt defensive at times, but cooperative. Gait is fast and impulsive with deviated path. Pt demonstrates poor use of rolling walker and feel using one at home may be more of a hinderance than it is helpful. Pt reports his sister plans to come to [facility] [address] to assist him at d/c. Pt would benefit from home PT safety eval.  Will continue to follow. Treatment Diagnosis: Decreased gait associated with dehydration. Decision Making: Medium Complexity  Patient Education: Role of PT. Pt verbalized partial understanding and would benefit from reinforcement. REQUIRES PT FOLLOW UP: Yes         Restrictions  Up with assist     Vision/Hearing  Vision: Within Functional Limits (readers)  Hearing: Within functional limits       Subjective  Chart Reviewed: Yes  Additional Pertinent Hx: Pt to ED  with multiple falls and admitted for dehydration. CXR: Mildly displaced right 4th-7th lateral rib fx. Head CT (-). Cspine CT (-) fx.   PMH:  ETOH abuse  Diagnosis: Dehydration    Subjective  Subjective: Pt found supine in bed. Pt agreeable for PT. \"I don't think I need that. \"  (re: walker)  Pain:  No    Orientation  Within Functional Limits     Social/Functional History  Lives With: Alone  Type of Home: House (condo)  Home Layout: Two level, Able to Live on Main level with bedroom/bathroom  Home Access: Stairs to enter with rails  Entrance Stairs - Number of Steps: 7+7  Bathroom Shower/Tub: Tub/Shower unit, Tub only  Bathroom Toilet: Standard  Home Equipment: [employer]  ADL Assistance: Independent  Homemaking Assistance: Independent  Ambulation Assistance: Independent  Transfer Assistance: Independent  Active : Yes  Occupation: Retired  Type of occupation: [employer]  Additional Comments: Pt reports indenpendent with all self care / IADLs. Objective  Strength  Strength RLE: WFL  Strength LLE: WFL    Bed mobility  Supine to Sit: Contact guard assistance (pulling up from therapist's hand)     Transfers  Sit to Stand: Contact guard assistance (cues for safety)  Stand to sit: Contact guard assistance (cues for safety)     Ambulation  Device: Rolling Walker  Assistance: Contact guard assistance  Quality of Gait: impulsive gait, deviated path, running walker into objects on L side, distracted  Distance: 200ft  Comments: Pt also ambulated 200ft without walker CGA, slightly deviated path and miss stepping at times, but no real loss of balance.     Balance  Sitting - Static: Good  Sitting - Dynamic: Good  Standing - Static: Fair (SBA with UE support)  Standing - Dynamic: Fair (CGA for ambulation)    Treatment included gait and transfer training with patient education     Plan   Times per week: 2-5  Current Treatment Recommendations: Transfer Training, Gait Training, Functional Mobility Training, Strengthening    Safety Devices  Type of devices: Left in chair, Chair alarm in place, Call light within reach, Nurse notified      AM-PAC Score  AM-PAC Inpatient Mobility Raw Score : 18 (10/01/21 1212)  AM-PAC Inpatient T-Scale Score : 43.63 (10/01/21 1212)  Mobility Inpatient CMS 0-100% Score: 46.58 (10/01/21 1212)  Mobility Inpatient CMS G-Code Modifier : CK (10/01/21 1212)    Goals  Short term goals  Time Frame for Short term goals: Discharge  Short term goal 1: supine <> sit modified independent  Short term goal 2: sit <> stand modified independent  Short term goal 3: ambulate >400ft with supervision  Patient Goals   Patient goals : Return home       Therapy Time   Individual Concurrent Group Co-treatment   Time In 1130         Time Out 1210         Minutes 40         Timed Code Treatment Minutes:  30  Total Treatment Minutes:  40    Lizbeth Long, PT

## 2021-10-01 NOTE — PLAN OF CARE
Problem: Falls - Risk of:  Goal: Will remain free from falls  Outcome: Ongoing   Pt remains free from injury during this shift. Pt is up with contact guard, gait belt. Encourage pt to call for all assistance. Call light is in reach, AVS and bed alarm is activated for safety, bed locked and in lowest position. Will continue to monitor. Problem: Skin Integrity:  Goal: Will show no infection signs and symptoms  Outcome: Ongoing   Pt skin is santiago in color, scattered abrasions, and laceration to the back of the head. No signs of infection noted. Will continue to monitor.

## 2021-10-01 NOTE — PROGRESS NOTES
HOSPITALISTS PROGRESS NOTE    10/1/2021 9:27 AM        Name: Peggy Sanford . Admitted: 9/29/2021  Primary Care Provider: Cinda Muller MD (Tel: [phone])      Problem List  Active Problems:    AMS (altered mental status)  Resolved Problems:    * No resolved hospital problems. *       Assessment & Plan:   Critical hypokalemia  Replace and recheck    UTI   Rocephin  Await urine culture    Alcohol withdrawal   Monitor for DTs  ciwa and ativan  Refused beer    BPH  Flomax     Improving CPK levels   Rib fractures     Debility   PT/OT ordered     IV Access: Peripheral  Lowe: No  Diet: ADULT DIET;  Regular  Code:Full Code  DVT PPX Lovenox  Disposition  Hopefully next 24-48 hours       Brief Course:        CC: falls     Subjective:  .     patient is alert,awake and oriented   Not forthcoming with his alcohol use  Lives alone   Improving cpk, denies any fight     Reviewed interval ancillary notes    Current Medications  potassium chloride (KLOR-CON M) extended release tablet 40 mEq, Q2H  tamsulosin (FLOMAX) capsule 0.4 mg, Daily  dextrose 5 % in lactated ringers infusion, Continuous  sodium chloride flush 0.9 % injection 5-40 mL, 2 times per day  sodium chloride flush 0.9 % injection 5-40 mL, PRN  0.9 % sodium chloride infusion, PRN  ondansetron (ZOFRAN-ODT) disintegrating tablet 4 mg, Q8H PRN   Or  ondansetron (ZOFRAN) injection 4 mg, Q6H PRN  polyethylene glycol (GLYCOLAX) packet 17 g, Daily PRN  cefTRIAXone (ROCEPHIN) 1000 mg IVPB in 50 mL D5W minibag, Q24H  thiamine mononitrate tablet 228 mg, Daily  folic acid (FOLVITE) tablet 1 mg, Daily  multivitamin 1 tablet, Daily  potassium chloride (KLOR-CON M) extended release tablet 40 mEq, PRN   Or  potassium bicarb-citric acid (EFFER-K) effervescent tablet 40 mEq, PRN   Or  potassium chloride 10 mEq/100 mL IVPB (Peripheral Line), PRN  magnesium sulfate 2000 mg in 50 mL IVPB premix, PRN  enoxaparin (LOVENOX) injection 40 mg, Daily  LORazepam (ATIVAN) tablet 1 mg, Q1H PRN   Or  LORazepam (ATIVAN) injection 1 mg, Q1H PRN   Or  LORazepam (ATIVAN) tablet 2 mg, Q1H PRN   Or  LORazepam (ATIVAN) injection 2 mg, Q1H PRN   Or  LORazepam (ATIVAN) tablet 3 mg, Q1H PRN   Or  LORazepam (ATIVAN) injection 3 mg, Q1H PRN   Or  LORazepam (ATIVAN) tablet 4 mg, Q1H PRN   Or  LORazepam (ATIVAN) injection 4 mg, Q1H PRN  glucose (GLUTOSE) 40 % oral gel 15 g, PRN  dextrose 50 % IV solution, PRN  glucagon (rDNA) injection 1 mg, PRN  dextrose 5 % solution, PRN        Objective:  /80   Pulse 74   Temp 98 °F (36.7 °C) (Oral)   Resp 18   Ht 5' 6\" (1.676 m)   Wt 132 lb (59.9 kg)   SpO2 91%   BMI 21.31 kg/m²     Intake/Output Summary (Last 24 hours) at 10/1/2021 7465  Last data filed at 9/30/2021 1829  Gross per 24 hour   Intake 2650.92 ml   Output --   Net 2650.92 ml      Wt Readings from Last 3 Encounters:   09/30/21 132 lb (59.9 kg)   skin tears and lacerations noticed almost all over the body  axaxox3   General appearance:  Appears comfortable  Eyes: Sclera clear. Pupils equal.  ENT: Moist oral mucosa. Trachea midline, no adenopathy. Cardiovascular: Regular rhythm, normal S1, S2. No murmur. No edema in lower extremities  Respiratory: Not using accessory muscles. Good inspiratory effort. Clear to auscultation bilaterally, no wheeze or crackles. GI: Abdomen soft, no tenderness, not distended, normal bowel sounds  Musculoskeletal: No cyanosis in digits, neck supple  Neurology: CN 2-12 grossly intact. No speech or motor deficits    Skin: Warm, dry, normal turgor  Extremity exam shows brisk capillary refill.   Peripheral pulses are palpable in lower extremities     Labs and Tests:  CBC:   Recent Labs     09/29/21  2155 10/01/21  0740   WBC 15.8* 9.8   HGB 14.8 12.0*    194     BMP:    Recent Labs     09/29/21  2155 10/01/21  0740   * 136   K 4.1 2.9*   CL 91* 100   CO2 19* 25   BUN 15 7 CREATININE 0.8 0.6*   GLUCOSE 68* 103*     Hepatic:   Recent Labs     09/29/21  2155 10/01/21  0740   * 68*   ALT 44* 40   BILITOT 0.6 <0.2   ALKPHOS 134* 103     CT Cervical Spine WO Contrast   Final Result      1. No acute intracranial process. Left parieto-occipital scalp swelling. 2.  No evidence of acute fracture in the cervical spine. Moderate to severe cervical spondylosis. CT Head WO Contrast   Final Result      1. No acute intracranial process. Left parieto-occipital scalp swelling. 2.  No evidence of acute fracture in the cervical spine. Moderate to severe cervical spondylosis. XR CHEST (2 VW)   Final Result      Mildly displaced right fourth through seventh lateral rib fractures. No acute pulmonary disease.          d/w with family       Garfield Velez MD   10/1/2021 9:27 AM

## 2021-10-02 LAB
A/G RATIO: 1 (ref 1.1–2.2)
ALBUMIN SERPL-MCNC: 2.9 G/DL (ref 3.4–5)
ALP BLD-CCNC: 102 U/L (ref 40–129)
ALT SERPL-CCNC: 58 U/L (ref 10–40)
ANION GAP SERPL CALCULATED.3IONS-SCNC: 11 MMOL/L (ref 3–16)
AST SERPL-CCNC: 76 U/L (ref 15–37)
BILIRUB SERPL-MCNC: 0.3 MG/DL (ref 0–1)
BUN BLDV-MCNC: 4 MG/DL (ref 7–20)
CALCIUM SERPL-MCNC: 8.4 MG/DL (ref 8.3–10.6)
CHLORIDE BLD-SCNC: 96 MMOL/L (ref 99–110)
CO2: 26 MMOL/L (ref 21–32)
CREAT SERPL-MCNC: <0.5 MG/DL (ref 0.8–1.3)
GFR AFRICAN AMERICAN: >60
GFR NON-AFRICAN AMERICAN: >60
GLOBULIN: 2.8 G/DL
GLUCOSE BLD-MCNC: 121 MG/DL (ref 70–99)
GLUCOSE BLD-MCNC: 146 MG/DL (ref 70–99)
GLUCOSE BLD-MCNC: 99 MG/DL (ref 70–99)
HCT VFR BLD CALC: 34.3 % (ref 40.5–52.5)
HEMOGLOBIN: 11.8 G/DL (ref 13.5–17.5)
MCH RBC QN AUTO: 34.8 PG (ref 26–34)
MCHC RBC AUTO-ENTMCNC: 34.4 G/DL (ref 31–36)
MCV RBC AUTO: 101.2 FL (ref 80–100)
PDW BLD-RTO: 13.2 % (ref 12.4–15.4)
PERFORMED ON: ABNORMAL
PERFORMED ON: NORMAL
PLATELET # BLD: 204 K/UL (ref 135–450)
PMV BLD AUTO: 7 FL (ref 5–10.5)
POTASSIUM REFLEX MAGNESIUM: 3.8 MMOL/L (ref 3.5–5.1)
RBC # BLD: 3.39 M/UL (ref 4.2–5.9)
SODIUM BLD-SCNC: 133 MMOL/L (ref 136–145)
TOTAL PROTEIN: 5.7 G/DL (ref 6.4–8.2)
WBC # BLD: 9.5 K/UL (ref 4–11)

## 2021-10-02 PROCEDURE — 51702 INSERT TEMP BLADDER CATH: CPT

## 2021-10-02 PROCEDURE — 2580000003 HC RX 258: Performed by: INTERNAL MEDICINE

## 2021-10-02 PROCEDURE — 1200000000 HC SEMI PRIVATE

## 2021-10-02 PROCEDURE — 85027 COMPLETE CBC AUTOMATED: CPT

## 2021-10-02 PROCEDURE — 80053 COMPREHEN METABOLIC PANEL: CPT

## 2021-10-02 PROCEDURE — 6360000002 HC RX W HCPCS: Performed by: INTERNAL MEDICINE

## 2021-10-02 PROCEDURE — 51798 US URINE CAPACITY MEASURE: CPT

## 2021-10-02 PROCEDURE — 6370000000 HC RX 637 (ALT 250 FOR IP): Performed by: INTERNAL MEDICINE

## 2021-10-02 PROCEDURE — 51701 INSERT BLADDER CATHETER: CPT

## 2021-10-02 PROCEDURE — 36415 COLL VENOUS BLD VENIPUNCTURE: CPT

## 2021-10-02 RX ORDER — LOPERAMIDE HYDROCHLORIDE 2 MG/1
2 CAPSULE ORAL 4 TIMES DAILY PRN
Status: DISCONTINUED | OUTPATIENT
Start: 2021-10-02 | End: 2021-10-04 | Stop reason: HOSPADM

## 2021-10-02 RX ORDER — FOLIC ACID 1 MG/1
1 TABLET ORAL DAILY
Qty: 90 TABLET | Refills: 1 | Status: ON HOLD | OUTPATIENT
Start: 2021-10-02 | End: 2022-03-14

## 2021-10-02 RX ORDER — LOPERAMIDE HYDROCHLORIDE 2 MG/1
2 CAPSULE ORAL ONCE
Status: COMPLETED | OUTPATIENT
Start: 2021-10-02 | End: 2021-10-02

## 2021-10-02 RX ORDER — MULTIVITAMIN WITH IRON
1 TABLET ORAL DAILY
Qty: 30 TABLET | Refills: 0 | Status: SHIPPED | OUTPATIENT
Start: 2021-10-02

## 2021-10-02 RX ORDER — THIAMINE MONONITRATE (VIT B1) 100 MG
100 TABLET ORAL DAILY
Qty: 30 TABLET | Refills: 0 | Status: ON HOLD | OUTPATIENT
Start: 2021-10-02 | End: 2022-03-14

## 2021-10-02 RX ADMIN — Medication 10 ML: at 19:53

## 2021-10-02 RX ADMIN — Medication 10 ML: at 08:39

## 2021-10-02 RX ADMIN — CEFTRIAXONE 1000 MG: 1 INJECTION, POWDER, FOR SOLUTION INTRAMUSCULAR; INTRAVENOUS at 03:02

## 2021-10-02 RX ADMIN — LORAZEPAM 1 MG: 1 TABLET ORAL at 19:53

## 2021-10-02 RX ADMIN — THERA TABS 1 TABLET: TAB at 08:39

## 2021-10-02 RX ADMIN — LOPERAMIDE HYDROCHLORIDE 2 MG: 2 CAPSULE ORAL at 10:27

## 2021-10-02 RX ADMIN — FOLIC ACID 1 MG: 1 TABLET ORAL at 08:38

## 2021-10-02 RX ADMIN — Medication 100 MG: at 08:39

## 2021-10-02 RX ADMIN — SODIUM CHLORIDE, SODIUM LACTATE, POTASSIUM CHLORIDE, CALCIUM CHLORIDE AND DEXTROSE MONOHYDRATE 1000 ML: 5; 600; 310; 30; 20 INJECTION, SOLUTION INTRAVENOUS at 00:47

## 2021-10-02 RX ADMIN — TAMSULOSIN HYDROCHLORIDE 0.4 MG: 0.4 CAPSULE ORAL at 08:39

## 2021-10-02 RX ADMIN — LOPERAMIDE HYDROCHLORIDE 2 MG: 2 CAPSULE ORAL at 06:26

## 2021-10-02 ASSESSMENT — PAIN SCALES - GENERAL
PAINLEVEL_OUTOF10: 0
PAINLEVEL_OUTOF10: 0

## 2021-10-02 NOTE — CARE COORDINATION
Cm following, DC order noted, pt unable to void, per notes pt needs to void prior to DC. Plan for pt to return home no needs noted if able to void, ;ast noted bladder scan >999 ml pt unable to void at this time, will cont to follow.    Electronically signed by Aliyah Huffman RN on 10/2/2021 at 1:02 PM  [phone]

## 2021-10-02 NOTE — PROGRESS NOTES
HOSPITALISTS PROGRESS NOTE    10/2/2021 4:01 PM        Name: Jena Baeza . Admitted: 9/29/2021  Primary Care Provider: Malia Sinha MD (Tel: [phone])      Problem List  Active Problems:    AMS (altered mental status)  Resolved Problems:    * No resolved hospital problems. *       Assessment & Plan:   Urinary retention  Had to straight cath the patient, patient is on Flomax, getting treatment for possible UTI, monitor patient does since having significant urine retention, improved patient can be discharged home      Critical hypokalemia  Replace and recheck    UTI   Rocephin  Await urine culture, urine cultures are negative    Alcohol withdrawal   Monitor for DTs  ciwa and ativan  Refused beer    BPH  Flomax     Improving CPK levels   Rib fractures     Debility   PT/OT ordered     IV Access: Peripheral  Lowe: No  Diet: ADULT DIET; Regular  Code:Full Code  DVT PPX Lovenox  Disposition hopefully once urine retention better, compliance seems to be issue, prefer not to send the patient with Lowe if possible      Brief Course:        CC: falls     Subjective:  .     Patient is sitting in the chair, feels better, urine cultures are negative    Reviewed interval ancillary notes    Current Medications  loperamide (IMODIUM) capsule 2 mg, 4x Daily PRN  tamsulosin (FLOMAX) capsule 0.4 mg, Daily  dextrose 5 % in lactated ringers infusion, Continuous  sodium chloride flush 0.9 % injection 5-40 mL, 2 times per day  sodium chloride flush 0.9 % injection 5-40 mL, PRN  0.9 % sodium chloride infusion, PRN  ondansetron (ZOFRAN-ODT) disintegrating tablet 4 mg, Q8H PRN   Or  ondansetron (ZOFRAN) injection 4 mg, Q6H PRN  polyethylene glycol (GLYCOLAX) packet 17 g, Daily PRN  cefTRIAXone (ROCEPHIN) 1000 mg IVPB in 50 mL D5W minibag, Q24H  thiamine mononitrate tablet 602 mg, Daily  folic acid (FOLVITE) tablet 1 mg, Daily  multivitamin 1 tablet, Daily  potassium chloride (KLOR-CON M) extended release tablet 40 mEq, PRN   Or  potassium bicarb-citric acid (EFFER-K) effervescent tablet 40 mEq, PRN   Or  potassium chloride 10 mEq/100 mL IVPB (Peripheral Line), PRN  magnesium sulfate 2000 mg in 50 mL IVPB premix, PRN  enoxaparin (LOVENOX) injection 40 mg, Daily  LORazepam (ATIVAN) tablet 1 mg, Q1H PRN   Or  LORazepam (ATIVAN) injection 1 mg, Q1H PRN   Or  LORazepam (ATIVAN) tablet 2 mg, Q1H PRN   Or  LORazepam (ATIVAN) injection 2 mg, Q1H PRN   Or  LORazepam (ATIVAN) tablet 3 mg, Q1H PRN   Or  LORazepam (ATIVAN) injection 3 mg, Q1H PRN   Or  LORazepam (ATIVAN) tablet 4 mg, Q1H PRN   Or  LORazepam (ATIVAN) injection 4 mg, Q1H PRN  glucose (GLUTOSE) 40 % oral gel 15 g, PRN  dextrose 50 % IV solution, PRN  glucagon (rDNA) injection 1 mg, PRN  dextrose 5 % solution, PRN        Objective:  BP (!) 143/90   Pulse 88   Temp 97.7 °F (36.5 °C) (Oral)   Resp 18   Ht 5' 6\" (1.676 m)   Wt 132 lb (59.9 kg)   SpO2 96%   BMI 21.31 kg/m²     Intake/Output Summary (Last 24 hours) at 10/2/2021 1601  Last data filed at 10/2/2021 1410  Gross per 24 hour   Intake 240 ml   Output 1650 ml   Net -1410 ml      Wt Readings from Last 3 Encounters:   09/30/21 132 lb (59.9 kg)   skin tears and lacerations noticed almost all over the body  axaxox3   General appearance:  Appears comfortable  Eyes: Sclera clear. Pupils equal.  ENT: Moist oral mucosa. Trachea midline, no adenopathy. Cardiovascular: Regular rhythm, normal S1, S2. No murmur. No edema in lower extremities  Respiratory: Not using accessory muscles. Good inspiratory effort. Clear to auscultation bilaterally, no wheeze or crackles. GI: Abdomen soft, no tenderness, not distended, normal bowel sounds  Musculoskeletal: No cyanosis in digits, neck supple  Neurology: CN 2-12 grossly intact. No speech or motor deficits    Skin: Warm, dry, normal turgor  Extremity exam shows brisk capillary refill.   Peripheral pulses are palpable in lower extremities     Labs and Tests:  CBC:   Recent Labs     09/29/21  2155 10/01/21  0740 10/02/21  0835   WBC 15.8* 9.8 9.5   HGB 14.8 12.0* 11.8*    194 204     BMP:    Recent Labs     09/29/21  2155 10/01/21  0740 10/02/21  0835   * 136 133*   K 4.1 2.9* 3.8   CL 91* 100 96*   CO2 19* 25 26   BUN 15 7 4*   CREATININE 0.8 0.6* <0.5*   GLUCOSE 68* 103* 146*     Hepatic:   Recent Labs     09/29/21  2155 10/01/21  0740 10/02/21  0835   * 68* 76*   ALT 44* 40 58*   BILITOT 0.6 <0.2 0.3   ALKPHOS 134* 103 102     CT Cervical Spine WO Contrast   Final Result      1. No acute intracranial process. Left parieto-occipital scalp swelling. 2.  No evidence of acute fracture in the cervical spine. Moderate to severe cervical spondylosis. CT Head WO Contrast   Final Result      1. No acute intracranial process. Left parieto-occipital scalp swelling. 2.  No evidence of acute fracture in the cervical spine. Moderate to severe cervical spondylosis. XR CHEST (2 VW)   Final Result      Mildly displaced right fourth through seventh lateral rib fractures. No acute pulmonary disease.          d/w with family       Alexander Melton MD   10/2/2021 4:01 PM

## 2021-10-02 NOTE — DISCHARGE SUMMARY
urinary retention needing Lowe catheterization. Patient did had partially successful voiding trial.  But he was adamant on leaving and was discharged home and follow-up with urology. Urology did see the patient during the stay. She was also found to have rib fractures and elevated CK level suggestive of possible mild rhabdomyolysis. Plan of care was discussed with the patient and also the patient's son during the stay. Consults. IP CONSULT TO HOSPITALIST  IP CONSULT TO SOCIAL WORK  IP CONSULT TO UROLOGY    Physical examination on discharge day. BP (!) 150/78   Pulse 72   Temp 97.6 °F (36.4 °C) (Oral)   Resp 16   Ht 5' 6\" (1.676 m)   Wt 132 lb (59.9 kg)   SpO2 97%   BMI 21.31 kg/m²   General appearance. Alert. Looks comfortable. HEENT. Sclera clear. Moist mucus membranes. Cardiovascular. Regular rate and rhythm, normal S1, S2. No murmur. Respiratory. Not using accessory muscles. Clear to auscultation bilaterally, no wheeze. Gastrointestinal. Abdomen soft, non-tender, not distended, normal bowel sounds  Neurology. Facial symmetry. No speech deficits. Moving all extremities equally. Extremities. No edema in lower extremities. Skin. Warm, dry, normal turgor        Condition at time of discharge stable    Medication instructions provided to patient at discharge.      Medication List      START taking these medications    folic acid 1 MG tablet  Commonly known as: FOLVITE  Take 1 tablet by mouth daily     multivitamin Tabs tablet  Take 1 tablet by mouth daily     vitamin B-1 100 MG tablet  Commonly known as: THIAMINE  Take 1 tablet by mouth daily        CONTINUE taking these medications    loperamide 2 MG capsule  Commonly known as: IMODIUM     tamsulosin 0.4 MG capsule  Commonly known as: FLOMAX  Take 1 capsule by mouth daily           Where to Get Your Medications      These medications were sent to [facility], [address] [place] [address]    Phone: [phone]   · folic acid 1 MG tablet  · multivitamin Tabs tablet  · tamsulosin 0.4 MG capsule  · vitamin B-1 100 MG tablet         Discharge recommendations given to patient. Follow Up. Primary care provider and urology  Disposition. home  Activity. activity as tolerated  Diet: ADULT DIET; Regular      Spent 35 minutes in discharge process.     Signed:  Alyse Carter MD     10/4/2021 1:51 PM

## 2021-10-02 NOTE — PROGRESS NOTES
Pt unable to void on own. Bladder scan >999 ml. MD aware and at bedside. Pt needs to void per d/c order before discharging per MD. Pt up to BR and again unable to void. Per MD to give pt a few hours to try to void on own.

## 2021-10-03 LAB
GLUCOSE BLD-MCNC: 77 MG/DL (ref 70–99)
HCT VFR BLD CALC: 35.2 % (ref 40.5–52.5)
HEMOGLOBIN: 12 G/DL (ref 13.5–17.5)
MCH RBC QN AUTO: 34.4 PG (ref 26–34)
MCHC RBC AUTO-ENTMCNC: 34.3 G/DL (ref 31–36)
MCV RBC AUTO: 100.3 FL (ref 80–100)
PDW BLD-RTO: 13.1 % (ref 12.4–15.4)
PERFORMED ON: NORMAL
PLATELET # BLD: 217 K/UL (ref 135–450)
PMV BLD AUTO: 7.1 FL (ref 5–10.5)
RBC # BLD: 3.5 M/UL (ref 4.2–5.9)
WBC # BLD: 8.9 K/UL (ref 4–11)

## 2021-10-03 PROCEDURE — 6370000000 HC RX 637 (ALT 250 FOR IP): Performed by: INTERNAL MEDICINE

## 2021-10-03 PROCEDURE — 1200000000 HC SEMI PRIVATE

## 2021-10-03 PROCEDURE — 36415 COLL VENOUS BLD VENIPUNCTURE: CPT

## 2021-10-03 PROCEDURE — 2580000003 HC RX 258: Performed by: INTERNAL MEDICINE

## 2021-10-03 PROCEDURE — 6360000002 HC RX W HCPCS: Performed by: INTERNAL MEDICINE

## 2021-10-03 PROCEDURE — 85027 COMPLETE CBC AUTOMATED: CPT

## 2021-10-03 RX ADMIN — CEFTRIAXONE 1000 MG: 1 INJECTION, POWDER, FOR SOLUTION INTRAMUSCULAR; INTRAVENOUS at 02:47

## 2021-10-03 RX ADMIN — FOLIC ACID 1 MG: 1 TABLET ORAL at 09:55

## 2021-10-03 RX ADMIN — Medication 5 ML: at 21:55

## 2021-10-03 RX ADMIN — LORAZEPAM 1 MG: 1 TABLET ORAL at 21:55

## 2021-10-03 RX ADMIN — LORAZEPAM 1 MG: 1 TABLET ORAL at 23:34

## 2021-10-03 RX ADMIN — Medication 10 ML: at 09:55

## 2021-10-03 RX ADMIN — LOPERAMIDE HYDROCHLORIDE 2 MG: 2 CAPSULE ORAL at 21:55

## 2021-10-03 RX ADMIN — TAMSULOSIN HYDROCHLORIDE 0.4 MG: 0.4 CAPSULE ORAL at 09:55

## 2021-10-03 RX ADMIN — THERA TABS 1 TABLET: TAB at 09:55

## 2021-10-03 RX ADMIN — Medication 100 MG: at 09:55

## 2021-10-03 NOTE — PROGRESS NOTES
HOSPITALISTS PROGRESS NOTE    10/3/2021 11:55 AM        Name: Corrinne Climes . Admitted: 9/29/2021  Primary Care Provider: Eliel Carrillo MD (Tel: [phone])      Problem List  Active Problems:    AMS (altered mental status)  Resolved Problems:    * No resolved hospital problems. *       Assessment & Plan:   Urinary retention  Had to straight cath the patient, patient is on Flomax, getting treatment for possible UTI,   Had to put a Lowe catheter in, will get to urology input regarding further management and possible follow-up recommendations if patient has to go with Lowe in, patient did had elevated PSA during last check which also can be followed up by urology service        Critical hypokalemia  Replace and recheck    UTI   Rocephin  Await urine culture, urine cultures are negative    Alcohol withdrawal   Monitor for DTs  ciwa and ativan  Refused beer    BPH  Flomax     Improving CPK levels   Rib fractures     Debility   PT/OT ordered     IV Access: Peripheral  Lowe: No  Diet: ADULT DIET; Regular  Code:Full Code  DVT PPX Lovenox  Disposition we will await urology input, either has to send the patient with Lowe catheter or might have to monitor overnight for possible voiding trial in the a.m. Brief Course:   Patient admitted with altered mental status. Did had evidence of alcohol intoxication. Also had mild rhabdo. Also was concern of UTI. Patient did improve no significant alcohol withdrawal but appears anxious at times. Discharge is held because of urinary retention. Urology has been consulted. CC: falls     Subjective:  .     Son is at bedside, overnight issues noticed, persistent urinary retention had to put a catheter in    Reviewed interval ancillary notes    Current Medications  loperamide (IMODIUM) capsule 2 mg, 4x Daily PRN  tamsulosin (FLOMAX) capsule 0.4 mg, Daily  dextrose 5 % in lactated ringers midline, no adenopathy. Cardiovascular: Regular rhythm, normal S1, S2. No murmur. No edema in lower extremities  Respiratory: Not using accessory muscles. Good inspiratory effort. Clear to auscultation bilaterally, no wheeze or crackles. GI: Abdomen soft, no tenderness, not distended, normal bowel sounds  Musculoskeletal: No cyanosis in digits, neck supple  Neurology: CN 2-12 grossly intact. No speech or motor deficits    Skin: Warm, dry, normal turgor  Extremity exam shows brisk capillary refill. Peripheral pulses are palpable in lower extremities     Labs and Tests:  CBC:   Recent Labs     10/01/21  0740 10/02/21  0835 10/03/21  0603   WBC 9.8 9.5 8.9   HGB 12.0* 11.8* 12.0*    204 217     BMP:    Recent Labs     10/01/21  0740 10/02/21  0835    133*   K 2.9* 3.8    96*   CO2 25 26   BUN 7 4*   CREATININE 0.6* <0.5*   GLUCOSE 103* 146*     Hepatic:   Recent Labs     10/01/21  0740 10/02/21  0835   AST 68* 76*   ALT 40 58*   BILITOT <0.2 0.3   ALKPHOS 103 102     CT Cervical Spine WO Contrast   Final Result      1. No acute intracranial process. Left parieto-occipital scalp swelling. 2.  No evidence of acute fracture in the cervical spine. Moderate to severe cervical spondylosis. CT Head WO Contrast   Final Result      1. No acute intracranial process. Left parieto-occipital scalp swelling. 2.  No evidence of acute fracture in the cervical spine. Moderate to severe cervical spondylosis. XR CHEST (2 VW)   Final Result      Mildly displaced right fourth through seventh lateral rib fractures. No acute pulmonary disease.          d/w with family       Lg Moscoso MD   10/3/2021 11:55 AM

## 2021-10-03 NOTE — PROGRESS NOTES
Patient complaining of inability to void bladder. Patient bladder scanned for >999 ml. MD notified. Per MD order, avendaño catheter placed. 1500 ml removed from bladder. Will continue to monitor and reassess.

## 2021-10-03 NOTE — PLAN OF CARE
Problem: Falls - Risk of:  Goal: Will remain free from falls  Description: Will remain free from falls  10/2/2021 2348 by Mauro Julio RN  Outcome: Ongoing  Patient remains free from falls during this shift. Patient is up x1 person assist with a gait belt. Bed is in the lowest position and the bed alarm is activated. Anti-slip socks are on. Call light is within reach. Will continue to monitor and reassess. Problem: Confusion - Acute:  Goal: Absence of continued neurological deterioration signs and symptoms  Description: Absence of continued neurological deterioration signs and symptoms  Outcome: Ongoing   Patient is alert and oriented x4. No changes in neuro assessment. Will continue to monitor and reassess.

## 2021-10-04 VITALS
DIASTOLIC BLOOD PRESSURE: 78 MMHG | SYSTOLIC BLOOD PRESSURE: 150 MMHG | HEIGHT: 66 IN | WEIGHT: 132 LBS | OXYGEN SATURATION: 97 % | BODY MASS INDEX: 21.21 KG/M2 | HEART RATE: 72 BPM | TEMPERATURE: 97.6 F | RESPIRATION RATE: 16 BRPM

## 2021-10-04 LAB
GLUCOSE BLD-MCNC: 100 MG/DL (ref 70–99)
GLUCOSE BLD-MCNC: 102 MG/DL (ref 70–99)
HCT VFR BLD CALC: 37.9 % (ref 40.5–52.5)
HEMOGLOBIN: 13.1 G/DL (ref 13.5–17.5)
MCH RBC QN AUTO: 34.5 PG (ref 26–34)
MCHC RBC AUTO-ENTMCNC: 34.4 G/DL (ref 31–36)
MCV RBC AUTO: 100.1 FL (ref 80–100)
PDW BLD-RTO: 13.2 % (ref 12.4–15.4)
PERFORMED ON: ABNORMAL
PERFORMED ON: ABNORMAL
PLATELET # BLD: 249 K/UL (ref 135–450)
PMV BLD AUTO: 6.8 FL (ref 5–10.5)
RBC # BLD: 3.79 M/UL (ref 4.2–5.9)
WBC # BLD: 8.8 K/UL (ref 4–11)

## 2021-10-04 PROCEDURE — 6370000000 HC RX 637 (ALT 250 FOR IP): Performed by: INTERNAL MEDICINE

## 2021-10-04 PROCEDURE — 36415 COLL VENOUS BLD VENIPUNCTURE: CPT

## 2021-10-04 PROCEDURE — 97530 THERAPEUTIC ACTIVITIES: CPT

## 2021-10-04 PROCEDURE — 6360000002 HC RX W HCPCS: Performed by: INTERNAL MEDICINE

## 2021-10-04 PROCEDURE — 97110 THERAPEUTIC EXERCISES: CPT

## 2021-10-04 PROCEDURE — 97535 SELF CARE MNGMENT TRAINING: CPT

## 2021-10-04 PROCEDURE — 85027 COMPLETE CBC AUTOMATED: CPT

## 2021-10-04 PROCEDURE — 2580000003 HC RX 258: Performed by: INTERNAL MEDICINE

## 2021-10-04 PROCEDURE — 51798 US URINE CAPACITY MEASURE: CPT

## 2021-10-04 RX ORDER — CYCLOBENZAPRINE HCL 10 MG
10 TABLET ORAL 3 TIMES DAILY PRN
Status: DISCONTINUED | OUTPATIENT
Start: 2021-10-04 | End: 2021-10-04 | Stop reason: HOSPADM

## 2021-10-04 RX ORDER — ACETAMINOPHEN 325 MG/1
650 TABLET ORAL EVERY 4 HOURS PRN
Status: DISCONTINUED | OUTPATIENT
Start: 2021-10-04 | End: 2021-10-04 | Stop reason: HOSPADM

## 2021-10-04 RX ORDER — TAMSULOSIN HYDROCHLORIDE 0.4 MG/1
0.4 CAPSULE ORAL DAILY
Qty: 30 CAPSULE | Refills: 3 | Status: ON HOLD | OUTPATIENT
Start: 2021-10-04 | End: 2022-03-14

## 2021-10-04 RX ADMIN — Medication 10 ML: at 09:01

## 2021-10-04 RX ADMIN — ACETAMINOPHEN 650 MG: 325 TABLET ORAL at 14:15

## 2021-10-04 RX ADMIN — FOLIC ACID 1 MG: 1 TABLET ORAL at 09:01

## 2021-10-04 RX ADMIN — Medication 100 MG: at 09:01

## 2021-10-04 RX ADMIN — CYCLOBENZAPRINE 10 MG: 10 TABLET, FILM COATED ORAL at 13:09

## 2021-10-04 RX ADMIN — TAMSULOSIN HYDROCHLORIDE 0.4 MG: 0.4 CAPSULE ORAL at 09:01

## 2021-10-04 RX ADMIN — THERA TABS 1 TABLET: TAB at 09:01

## 2021-10-04 RX ADMIN — CEFTRIAXONE 1000 MG: 1 INJECTION, POWDER, FOR SOLUTION INTRAMUSCULAR; INTRAVENOUS at 02:56

## 2021-10-04 ASSESSMENT — PAIN DESCRIPTION - ORIENTATION: ORIENTATION: RIGHT

## 2021-10-04 ASSESSMENT — PAIN DESCRIPTION - PROGRESSION: CLINICAL_PROGRESSION: NOT CHANGED

## 2021-10-04 ASSESSMENT — PAIN DESCRIPTION - PAIN TYPE: TYPE: ACUTE PAIN

## 2021-10-04 ASSESSMENT — PAIN - FUNCTIONAL ASSESSMENT: PAIN_FUNCTIONAL_ASSESSMENT: PREVENTS OR INTERFERES SOME ACTIVE ACTIVITIES AND ADLS

## 2021-10-04 ASSESSMENT — PAIN SCALES - GENERAL
PAINLEVEL_OUTOF10: 0
PAINLEVEL_OUTOF10: 3

## 2021-10-04 ASSESSMENT — PAIN DESCRIPTION - LOCATION: LOCATION: SHOULDER

## 2021-10-04 ASSESSMENT — PAIN DESCRIPTION - ONSET: ONSET: ON-GOING

## 2021-10-04 ASSESSMENT — PAIN DESCRIPTION - DESCRIPTORS: DESCRIPTORS: ACHING

## 2021-10-04 ASSESSMENT — PAIN DESCRIPTION - FREQUENCY: FREQUENCY: CONTINUOUS

## 2021-10-04 NOTE — PROGRESS NOTES
Urology Attending Progress Note      Subjective: Avendaño removed this AM. No  complaints. Vitals:  BP (!) 149/83   Pulse 67   Temp 98 °F (36.7 °C) (Oral)   Resp 18   Ht 5' 6\" (1.676 m)   Wt 132 lb (59.9 kg)   SpO2 94%   BMI 21.31 kg/m²   Temp  Av.9 °F (36.6 °C)  Min: 97.5 °F (36.4 °C)  Max: 98.2 °F (36.8 °C)    Intake/Output Summary (Last 24 hours) at 10/4/2021 09  Last data filed at 10/4/2021 5605  Gross per 24 hour   Intake 360 ml   Output 2650 ml   Net -2290 ml       Exam: Awaiting first void    Labs:  WBC:    Lab Results   Component Value Date    WBC 8.8 10/04/2021     Hemoglobin/Hematocrit:    Lab Results   Component Value Date    HGB 13.1 10/04/2021    HCT 37.9 10/04/2021     BMP:    Lab Results   Component Value Date     10/02/2021    K 3.8 10/02/2021    CL 96 10/02/2021    CO2 26 10/02/2021    BUN 4 10/02/2021    LABALBU 2.9 10/02/2021    CREATININE <0.5 10/02/2021    CALCIUM 8.4 10/02/2021    GFRAA >60 10/02/2021    LABGLOM >60 10/02/2021     PT/INR:    Lab Results   Component Value Date    PROTIME 13.7 2021    INR 1.20 2021     PTT:  No results found for: APTT[APTT    Impression/Plan: 66yo M with high volume urinary retention. On Tamsulosin. Unclear if this is chronic home med? Voiding trial this AM    -Avendaño removed this morning at 0630  -Continue Flomax  -If patient cannot void by this afternoon, bladder scan. Ok to replace avendaño if PVR>500cc.  He will then see us as outpatient for voiding trial and further discussion of PSA and can be discharged with avendaño.        JENNA Paniagua

## 2021-10-04 NOTE — CARE COORDINATION
Case Management            Discharge Note                    Date / Time of Note: 10/4/2021 11:07 AM                  Discharge Note Completed by: PHILIP Bonilla    Patient Name: Corrinne Climes   YOB: 1950  Diagnosis: Dehydration [E86.0]  Laceration of scalp, initial encounter [S01.01XA]  Non-traumatic rhabdomyolysis [M62.82]  AMS (altered mental status) [R41.82]   Date / Time: 9/29/2021  8:56 PM    Current PCP: Eliel Carrillo MD  Clinic patient: No    Hospitalization in the last 30 days: No    Advance Directives:  Code Status: Full Code  [place] DNR form completed and on chart: Not Indicated    Financial:  Payor: Floyd Zuleta / Plan: Ailin Simon PPO / Product Type: Medicare /      Pharmacy:    [facility], [address] [phone] [place] [phone]  [address] [id number]  Phone: [phone] Fax: [phone]      Assistance purchasing medications?: Potential Assistance Purchasing Medications: No  Assistance provided by Case Management: None at this time    Does patient want to participate in local refill/ meds to beds program?: Not Assessed    Meds To Beds General Rules:  1. Can ONLY be done Monday- Friday between 8:30am-5pm  2. Prescription(s) must be in pharmacy by 3pm to be filled same day  3. Copy of patient's insurance/ prescription drug card and patient face sheet must be sent along with the prescription(s)  4. Cost of Rx cannot be added to hospital bill. If financial assistance is needed, please contact unit  or ;  or  CANNOT provide pharmacy voucher for patients co-pays  5.  Patients can then  the prescription on their way out of the hospital at discharge, or pharmacy can deliver to the bedside if staff is available. (payment due at time of pick-up or delivery - cash, check, or card accepted)     Able to afford home medications/ co-pay costs: Yes    ADLS:  Current PT AM-PAC Score: 18 /24  Current OT AM-PAC Score: 22 /24      Discharge Disposition: Home- No Services Needed    LOC at discharge: Not Applicable  TOM Completed: Not Indicated    Notification completed in HENS/PAS?:  Not Applicable    IMM Completed:   No    Transportation:  Transportation Plan for discharge: family   Mode of Transport: Private Car    Home Care:  Home Care ordered at discharge: declined     Additional CM Notes:   SW met with patient at bedside on this date. Patient to return home today. He denies any needs for home care services as he feels he is at or near his baseline. He has transport home. No home care needs noted. The Plan for Transition of Care is related to the following treatment goals Dehydration [E86.0]  Laceration of scalp, initial encounter [S01.01XA]  Non-traumatic rhabdomyolysis [M62.82]  AMS (altered mental status) [R41.82]      The Patient and/or patient representative  was provided with a choice of provider and agrees with the discharge plan Yes    Freedom of choice list was provided with basic dialogue that supports the patient's individualized plan of care/goals and shares the quality data associated with the providers.  Yes    Care Transitions patient: No    PHILIP Chinchilla  [facility]   Case Management Department  Ph: [phone]

## 2021-10-04 NOTE — PROGRESS NOTES
reingforce as needed  Activity Tolerance  Activity Tolerance: Patient Tolerated treatment well  Activity Tolerance: Pt with slight dizziness after activity resolving quickly. BP taken at 143/88. Safety Devices  Safety Devices in place: Yes  Type of devices: Left in chair;Nurse notified;Gait belt;Call light within reach; Chair alarm in place         Patient Diagnosis(es): The primary encounter diagnosis was Non-traumatic rhabdomyolysis. Diagnoses of Laceration of scalp, initial encounter and Dehydration were also pertinent to this visit. has no past medical history on file. has no past surgical history on file. Restrictions  Position Activity Restriction  Other position/activity restrictions: Up with assist       Subjective   General  Chart Reviewed: Yes  Additional Pertinent Hx: Admit 9/29  Family / Caregiver Present: No  Subjective  Subjective: Pt supine in bed and agreeable to OT treatment      Orientation   oriented x4      Objective    ADL  Grooming: Supervision (stance at sink  for oral care and washing hands and face)  UE Dressing: Contact guard assistance (for ties)  LE Dressing: Modified independent  (effortful movement and increased time for completion)  Additional Comments: Pt educated for sitting on shower seat while showering. pt stating \"I have a shower seat\"        Balance  Sitting Balance: Independent  Standing Balance: Supervision  Standing Balance  Activity: Tranfers, toand from bathroom and around room for item retrieval  Comment: Pt with one slight LOB while turning.  Pt educated to slow down while turning  Bed mobility  Supine to Sit: Contact guard assistance (Pt with very effortful movement reaching out for assist but encourage for increased indepdence)  Scooting: Stand by assistance  Transfers  Sit to stand: Supervision  Stand to sit: Supervision  Transfer Comments: Pt educated for safe positionoing transfering from EOB to recliner chair            Type of ROM/Therapeutic Exercise  Comment: 2 sets of 5 chair push ups completed with rest break between sets                    Plan  If pt discharges prior to next treatment, this note will serve as discharge summary  Plan  Times per week: 2-5x  Times per day: Daily  Current Treatment Recommendations: Functional Mobility Training, Safety Education & Training, Self-Care / ADL, Patient/Caregiver Education & Training, Equipment Evaluation, Education, & procurement           AM-PAC Score        AM-PAC Inpatient Daily Activity Raw Score: 22 (10/04/21 0950)  AM-PAC Inpatient ADL T-Scale Score : 47.1 (10/04/21 0950)  ADL Inpatient CMS 0-100% Score: 25.8 (10/04/21 0950)  ADL Inpatient CMS G-Code Modifier : Clinton Flaherty (10/04/21 0950)    Goals (as determined and assessed by primary OT)  Short term goals  Time Frame for Short term goals: at d/c  Short term goal 1: Verb 2  to use at home  Short term goal 2: Chair push ups x 5 reps x 2 sets with supervision - goal met 10/4  Short term goal 3: Stance x 10 mins with Mod independence for ADLs / item retrieval - ongoing       Therapy Time   Individual Concurrent Group Co-treatment   Time In 0855         Time Out 0940         Minutes 45         Timed Code Treatment Minutes: Sweta Eastman 364, R Deejay Bazzi 46

## 2021-10-04 NOTE — CONSULTS
Patient Name: Alexandra Murrell  : 1950  Age: 66 y.o. Sex: male    INDICATION FOR CONSULT: urinary retention    History of Present Illness: 64yo M with recent alcohol abuse presently admitted with initial concern for altered mental status. Urology consulted for urinary retention of nearly 1.7 liters prior to cath insertion. Patient denies baseline LUTs. Has recently been followed by PCP for elevated PSA. ALLERGY:  No Known Allergies    HOME MEDICATIONS:  Medications Prior to Admission: tamsulosin (FLOMAX) 0.4 MG capsule, Take 0.4 mg by mouth daily  loperamide (IMODIUM) 2 MG capsule, Take 2 mg by mouth 4 times daily as needed for Diarrhea       Past Medical History:   No past medical history on file. Past Surgical History:   No past surgical history on file. Family History:  No family history on file. IMMUNIZATIONS:  Immunization History   Administered Date(s) Administered    COVID-19, Moderna, PF, 100mcg/0.5mL 2021, 2021    Tdap (Boostrix, Adacel) 2021       SOCIAL History:  Social History     Socioeconomic History    Marital status: Single     Spouse name: Not on file    Number of children: Not on file    Years of education: Not on file    Highest education level: Not on file   Occupational History    Not on file   Tobacco Use    Smoking status: Former Smoker   Substance and Sexual Activity    Alcohol use: Not on file    Drug use: Not on file    Sexual activity: Not on file   Other Topics Concern    Not on file   Social History Narrative    Not on file     Social Determinants of Health     Financial Resource Strain:     Difficulty of Paying Living Expenses:    Food Insecurity:     Worried About Running Out of Food in the Last Year:     [address] in the Last Year:    Transportation Needs:     Lack of Transportation (Medical):      Lack of Transportation (Non-Medical):    Physical Activity:     Days of Exercise per Week:     Minutes of LABALBU 2.9 10/02/2021    CREATININE <0.5 10/02/2021    CALCIUM 8.4 10/02/2021    GFRAA >60 10/02/2021    LABGLOM >60 10/02/2021     PT/INR:    Lab Results   Component Value Date    PROTIME 13.7 09/29/2021    INR 1.20 09/29/2021     PTT:  No results found for: APTT[APTT    CULTURES:  URINE CULTURE  Results for orders placed or performed during the hospital encounter of 09/29/21   Culture, Urine    Specimen: Urine, clean catch   Result Value Ref Range    Urine Culture, Routine No growth at 18 to 36 hours        BLOOD CULTURE  No results found for this or any previous visit. IMPRESSION:  66yo M with high volume urinary retention. On Tamsulosin. Unclear if this is chronic home med? PLAN:  -Agree with Tamsulosin  -Patient wants voiding trial in AM.  Will order.  -Will need outpatient follow up regardless of outcome of above for bladder emptying and PSA concerns.     Jazmine Moran MD  10/3/2021

## 2021-10-04 NOTE — PROGRESS NOTES
Physician Progress Note      Shilpa Sawant  CSN #:                  [id number]  :                       1950  ADMIT DATE:       2021 8:56 PM  [address] [place] DATE:        10/4/2021 2:40 PM  RESPONDING  PROVIDER #:        Narda Merritt MD          QUERY TEXT:    Patient admitted with Fall and alcohol intoxication. Noted documentation of   sepsis and UTI in the H&P. Urine cx (obtained prior to Rocephin starting) is   negative,  In order to support the diagnosis of sepsis & UTI, please include   additional clinical indicators in your documentation. Or please document if   the diagnosis of sepsis & UTI has been ruled out after further study    The medical record reflects the following:  Risk Factors: Intoxicated, found lying on ground surrounded by beer cans and   whiskey bottles  Clinical Indicators: Urine cx negative, No LUTS noted; SIRS on admit: WBC:   15.8 (normal thereafter), HR: 101- 96, no LA nor Procal obtained; Urinary   retention noted on 10/3  Treatment: UA/ Urine cx, Rocephin  Options provided:  -- Sepsis and UTI was ruled out after study  -- Sepsis and UTI present as evidenced by, Please document evidence. -- Sepsis was ruled out, UTI present as evidenced by  -- Other - I will add my own diagnosis  -- Disagree - Not applicable / Not valid  -- Disagree - Clinically unable to determine / Unknown  -- Refer to Clinical Documentation Reviewer    PROVIDER RESPONSE TEXT:    Sepsis and UTI was ruled out after study. Query created by: Gilberto Navarro on 10/4/2021 2:13 PM      QUERY TEXT:    Pt admitted with fall and intoxication. Per H&P \"Altered mental   state-multifactorial: UTI versus hypoglycemia versus alcohol withdrawal versus   dementia related to alcohol use\". If possible, please document in the   progress notes and discharge summary if you are evaluating and / or treating   any of the following:       The medical record reflects the following:  Risk Factors: ***  Clinical Indicators: Admit Glucose: 68; Per ED \"found patient lying on the   ground in his home with multiple beer cans and whiskey bottles surrounding   him. Son states that patient is somewhat confused; states that he thinks   patient is likely intoxicated\"; Per H&P PMH  \"Unable to obtain due to altered   mental state\". Per 10/3 pn \"dmitted with altered mental status. Did had   evidence of alcohol intoxication\"  Treatment: D5 LR @ 100/ hr, 1L LR bolus, Rally pack x1, Thiamine, folic acid,   multivitamins daily, blood glucose 4 times daily for 24 hours, Multiple P.O. Ativan CIWA doses given and 1 IVP  Options provided:  -- Toxic and metabolic encephalopathy due to hypoglycemia and alcohol   intoxication  -- Metabolic encephalopathy due to hypoglycemia  -- Toxic encephalopathy due to alcohol intoxication  -- Alcoholic encephalopathy  -- Delirium due to alcohol intoxication  -- Other - I will add my own diagnosis  -- Disagree - Not applicable / Not valid  -- Disagree - Clinically unable to determine / Unknown  -- Refer to Clinical Documentation Reviewer    PROVIDER RESPONSE TEXT:    Patient has delirium due to alcohol intoxication. Query created by:  Joselyn Jorge on 10/4/2021 2:23 PM      Electronically signed by:  Debo Roman MD 10/4/2021 6:34 PM

## 2021-10-04 NOTE — PROGRESS NOTES
Patient has been able to void small amounts 50-75 mL per urinal. Bladder scan showing 330-395 mL retained. Urology notified via phone per their request. Patient refuses placement of Lowe catheter. Discussed with Urology - patient to D/C today, follow up with Urology in office tomorrow morning.

## 2021-10-04 NOTE — PLAN OF CARE
Problem: Falls - Risk of:  Goal: Will remain free from falls  Description: Will remain free from falls  10/4/2021 0927 by Andrew Perez RN  Outcome: Ongoing   All fall precautions in place. Bed locked and in lowest position with alarm on. Over-bed table and personal belongings within reach. Patient instructed to use call light for assistance. Non-skids socks on. Video monitoring on in patient room for patient safety and seizure precautions. Will continue to monitor. Problem: Mood - Altered:  Goal: Mood stable  Description: Mood stable  Outcome: Ongoing   Patient calm and cooperative this morning. Able to express needs. Expressed he is eager to D/C.

## 2021-10-04 NOTE — PROGRESS NOTES
Patient alert and oriented x4, VSS on room air, neuro status unchange. Patient up to chair much of the day and walked in the mora, x1 SBA with GB. Tolerated well. C/o pain to neck and shoulder - medicated per MAR with prn Flexeril and Tylenol. Patient tolerating PO fluids and meals well. Voiding per urinal, bladder scan per orders. All fall precautions in place. Video monitoring on in patient room for safety and seizure precautions. Anticipating D/C thi afternoon. Will continue to monitor.

## 2021-10-04 NOTE — PLAN OF CARE
Problem: Falls - Risk of:  Goal: Will remain free from falls  Description: Will remain free from falls  Outcome: Ongoing  Patient remains free from falls during this shift. Patient is up x1 person assist with walker. Bed is in the lowest position and the bed and chair alarm is activated. Anti-slip socks are on. Call light is within reach. Will continue to monitor and reassess. Problem: Confusion - Acute:  Goal: Absence of continued neurological deterioration signs and symptoms  Description: Absence of continued neurological deterioration signs and symptoms  Outcome: Ongoing   Patient alert and oriented. Will continue to monitor and reassess.

## 2021-10-04 NOTE — PROGRESS NOTES
Patient is alert and oriented. Vital signs are stable. Patient denies any pain. Lowe removed at 9368. Bed is in the lowest position. Bed alarm is activated. Call light is within reach. Will continue to monitor and reassess.

## 2021-10-04 NOTE — PROGRESS NOTES
Patient discharged. All discharge instructions reviewed with patient. Patient to follow up w/ Urology in the morning. PIV x1 removed w/o complications. Patient dressed in his own clothing and all personal belongings gathered. Patient taken out in wheelchair to family member's vehicle.

## 2022-02-28 ENCOUNTER — HOSPITAL ENCOUNTER (EMERGENCY)
Age: 72
Discharge: HOME | End: 2022-02-28
Attending: EMERGENCY MEDICINE
Payer: MEDICARE

## 2022-02-28 ENCOUNTER — APPOINTMENT (OUTPATIENT)
Dept: CT IMAGING | Age: 72
End: 2022-02-28
Payer: MEDICARE

## 2022-02-28 VITALS
BODY MASS INDEX: 21.19 KG/M2 | HEIGHT: 67 IN | WEIGHT: 135 LBS | TEMPERATURE: 98.4 F | DIASTOLIC BLOOD PRESSURE: 73 MMHG | HEART RATE: 64 BPM | RESPIRATION RATE: 20 BRPM | SYSTOLIC BLOOD PRESSURE: 155 MMHG
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
## 2022-02-28 DIAGNOSIS — S01.81XA LACERATION OF FOREHEAD, INITIAL ENCOUNTER: ICD-10-CM

## 2022-02-28 PROCEDURE — 70450 CT HEAD/BRAIN W/O DYE: CPT

## 2022-02-28 PROCEDURE — 12002 RPR S/N/AX/GEN/TRNK2.6-7.5CM: CPT

## 2022-02-28 PROCEDURE — 6370000000 HC RX 637 (ALT 250 FOR IP): Performed by: EMERGENCY MEDICINE

## 2022-02-28 PROCEDURE — 99284 EMERGENCY DEPT VISIT MOD MDM: CPT

## 2022-02-28 PROCEDURE — 2500000003 HC RX 250 WO HCPCS: Performed by: EMERGENCY MEDICINE

## 2022-02-28 RX ORDER — LIDOCAINE HYDROCHLORIDE AND EPINEPHRINE 10; 10 MG/ML; UG/ML
20 INJECTION, SOLUTION INFILTRATION; PERINEURAL ONCE
Status: DISCONTINUED | OUTPATIENT
Start: 2022-02-28 | End: 2022-02-28 | Stop reason: ALTCHOICE

## 2022-02-28 RX ORDER — ACETAMINOPHEN 500 MG
500 TABLET ORAL 4 TIMES DAILY PRN
Qty: 28 TABLET | Refills: 1 | Status: ON HOLD | OUTPATIENT
Start: 2022-02-28 | End: 2022-03-14

## 2022-02-28 RX ORDER — LIDOCAINE HYDROCHLORIDE AND EPINEPHRINE BITARTRATE 20; .01 MG/ML; MG/ML
20 INJECTION, SOLUTION SUBCUTANEOUS ONCE
Status: COMPLETED | OUTPATIENT
Start: 2022-02-28 | End: 2022-02-28

## 2022-02-28 RX ORDER — IBUPROFEN 400 MG/1
400 TABLET ORAL ONCE
Status: COMPLETED | OUTPATIENT
Start: 2022-02-28 | End: 2022-02-28

## 2022-02-28 RX ADMIN — IBUPROFEN 400 MG: 400 TABLET, FILM COATED ORAL at 15:18

## 2022-02-28 RX ADMIN — LIDOCAINE HYDROCHLORIDE AND EPINEPHRINE 20 ML: 20; 10 INJECTION, SOLUTION INFILTRATION; PERINEURAL at 13:44

## 2022-02-28 ASSESSMENT — PAIN SCALES - GENERAL
PAINLEVEL_OUTOF10: 0
PAINLEVEL_OUTOF10: 0

## 2022-02-28 NOTE — ED PROVIDER NOTES
[address] [facility]          ATTENDING PHYSICIAN NOTE       Date of evaluation: 2/28/2022    Chief Complaint     Fall (Pt reports tripping and falling hitting his head on hardwood floor. Pt denies LOC. Pt has multiple bruises all over him that he reports are from frequent falls. Pt states he doesn't use any ambulatory aides), Head Injury, and Facial Laceration      History of Present Illness     Alexis Angel is a 67 y.o. male with past medical history of hyperlipidemia, abdominal aortic aneurysm, enlarged prostate, and who has recently undergone subacute decline in mental and functional status per his son's report at bedside who presents to the emergency department after a fall at his rehab facility. Patient endorses mild pain in the right eyebrow/forehead. Denies neck or back pain, chest pain or abdominal pain, and pain in his arms or legs. There is some discordance in the circumstances. Patient reports that he was bending over to pick something up off the floor and fell forward. Son says that he was told by caregivers at the facility that his father stood up from his wheelchair and fell forward. Son says that father has been having frequent falls, was hospitalized around 2 weeks ago for another fall and then discharged into rehab. He says that his father forgets that he is weak and has difficulty walking and as result has attempted to walk without assistance several times, resulting in several falls. Otherwise, son says that his father is at his baseline mental status with mild short-term memory deficits. No new or concerning symptoms to his awareness. Review of Systems     Review of Systems    Pertinent positive and negative findings as documented in the HPI, otherwise other systems were reviewed and were negative. Past Medical, Surgical, Family, and Social History     He has no past medical history on file. He has no past surgical history on file.   His family history is not on file. He reports that he has quit smoking. He does not have any smokeless tobacco history on file. Medications     Previous Medications    FOLIC ACID (FOLVITE) 1 MG TABLET    Take 1 tablet by mouth daily    LOPERAMIDE (IMODIUM) 2 MG CAPSULE    Take 2 mg by mouth 4 times daily as needed for Diarrhea    MULTIPLE VITAMIN (MULTIVITAMIN) TABS TABLET    Take 1 tablet by mouth daily    TAMSULOSIN (FLOMAX) 0.4 MG CAPSULE    Take 1 capsule by mouth daily    THIAMINE MONONITRATE (THIAMINE) 100 MG TABLET    Take 1 tablet by mouth daily       Allergies     He has No Known Allergies. Physical Exam     INITIAL VITALS: BP: (!) 183/103, Temp: 98.4 °F (36.9 °C), Pulse: 64, Resp: 20,     Physical Exam    General: Well developed and well nourished. No acute distress. HEENT: Laceration above the right eyebrow. Appears superficial and hemostatic. Older appearing contusion superior laterally to this along the frontotemporal scalp. Otherwise no obvious extracranial injuries. PERRL, moist mucous membranes  Neck: Trachea midline, neck supple with FROM. Heart: Regular rate and rhythm. No murmurs, gallops, or rubs appreciated. Lungs: Clear to auscultation in all fields bilaterally. Normal effort. Abd: Nondistended, no signs of trauma. No tenderness to palpation, guarding, or rebound. MSK: No obvious deformities. Range of motion grossly intact. No tenderness to palpation, skin changes, or deformities in the cervical, thoracic, or lumbar spines. Extremities: No cyanosis or edema. Peripheral pulses intact. Skin: Extensive ecchymoses across the upper chest and shoulders, appear subacute. Neuro: Alert and oriented x 4, moves all extremities spontaneously. No gross motor or sensory deficits. Psych: Mood and affect appropriate. Thought process and content normal.    Diagnostic Results     EKG   None    RADIOLOGY:  CT Head WO Contrast   Final Result      1. No findings for acute intracranial abnormality. 2.  Right subdural collection measuring 6 mm in diameter, isointense to adjacent brain consistent with subacute hemorrhage. No associated mass effect. 2.  Mild right frontal scalp edema without underlying skull fracture. 4.  Age-related atrophy with mild periventricular white matter changes bilaterally consistent with chronic small vessel ischemia. Findings were discussed with Dr. Ashley Page in the emergency department on 2/28/2022 at 1:45 PM             LABS:   No results found for this visit on 02/28/22. ED BEDSIDE ULTRASOUND:  None    RECENT VITALS:  BP: (!) 183/103,Temp: 98.4 °F (36.9 °C), Pulse: 64, Resp: 20,       Procedures     Lac Repair    Date/Time: 2/28/2022 2:57 PM  Performed by: Nayely Flores MD  Authorized by: Nayely Flores MD     Consent:     Consent obtained:  Verbal    Consent given by:  Patient  Anesthesia (see MAR for exact dosages): Anesthesia method:  Local infiltration    Local anesthetic:  Lidocaine 2% WITH epi  Laceration details:     Location:  Scalp    Scalp location:  Frontal    Length (cm):  3    Depth (mm):  5  Repair type:     Repair type:  Simple  Pre-procedure details:     Preparation:  Patient was prepped and draped in usual sterile fashion  Exploration:     Contaminated: no    Treatment:     Area cleansed with:  Saline    Amount of cleaning:  Standard    Irrigation solution:  Sterile saline    Irrigation volume:  500    Irrigation method:  Syringe  Skin repair:     Repair method:  Sutures    Suture size:  6-0    Suture material:  Chromic gut    Number of sutures:  5  Approximation:     Approximation:  Close  Post-procedure details:     Dressing:  Adhesive bandage and sterile dressing    Patient tolerance of procedure: Tolerated well, no immediate complications        ED Course     Nursing Notes, Past Medical Hx, Past Surgical Hx, Social Hx,Allergies, and Family Hx were reviewed.     patient was given the following medications:  Orders Placed This Encounter Medications    DISCONTD: lidocaine-EPINEPHrine 1 %-1:821751 injection 20 mL    lidocaine-EPINEPHrine 2 percent-1:[id number] injection 20 mL    ibuprofen (ADVIL;MOTRIN) tablet 400 mg       CONSULTS:  None    MEDICAL DECISIONMAKING / ASSESSMENT / PLAN     Jb Thapa is a 67 y.o. male presenting with eyebrow laceration after ground-level fall. ED Course as of 02/28/22 1458   Mon Feb 28, 2022   1313 On presentation, patient afebrile, hemodynamically stable, no acute distress. GCS 15, primary survey intact, secondary survey notable for the laceration above the right eyebrow and the contusions to the scalp and upper body that appeared subacute in nature. In discussing circumstances of the fall with the patient's son, son states that this is not unusual over the last several weeks for his father. States that his father is at his baseline mental status, has not complained of any additional symptoms that would be concerning for a worsening underlying medical condition. We will proceed with head CT to rule out intracranial hemorrhage, wound care, do not believe that additional medical work-up is indicated at this time. [IM]   2295 Received a call from radiology regarding a subacute to chronic right frontal subdural hematoma. This is consistent with signs report of previous fall with \"brain bleed. \"  Radiologist confirmed that this is not an acute bleed and there were no acute findings. Laceration was irrigated and repaired with absorbable sutures. Patient was given instructions on wound care. Also given return precautions. Patient discharged in stable condition. [JH]      ED Course User Index  [JH] Renetta Joya MD       Clinical Impression     1. Fall, initial encounter    2. Laceration of forehead, initial encounter        Disposition     PATIENT REFERRED TO:  Miquel Gonzalez MD  [address] [facility] [address]  [phone]    Schedule an appointment as soon as possible for a visit in 3 days        DISCHARGE MEDICATIONS:  New Prescriptions    No medications on file       DISPOSITION Decision To Discharge 02/28/2022 02:50:57 PM         Yenny Simons MD  02/28/22 5978

## 2022-02-28 NOTE — ED NOTES
Ambulance was given pt's discharge paper work and son was explained discharge instructions.       Leda Vega RN  02/28/22 1102

## 2022-02-28 NOTE — ED NOTES
Pt high fall risk due to short term memory loss and forgetting he has difficulty with walking. Pt has been at nursing home x2 weeks, has had multiple falls with multiple injuries. Pt s/p fall today with laceration to eyebrow. Bleeding controlled. Bed alarm in place, son at bedside.       Brendan Roy RN  02/28/22 ALVARADO Stein  02/28/22 0700

## 2022-02-28 NOTE — ED NOTES
Attempted to call report to [facility] unable to contact nursing staff.  [phone] #[phone]     Jordy Partida RN  02/28/22 0685

## 2022-03-13 ENCOUNTER — APPOINTMENT (OUTPATIENT)
Dept: GENERAL RADIOLOGY | Age: 72
DRG: 602 | End: 2022-03-13
Payer: MEDICARE

## 2022-03-13 ENCOUNTER — APPOINTMENT (OUTPATIENT)
Dept: CT IMAGING | Age: 72
DRG: 602 | End: 2022-03-13
Payer: MEDICARE

## 2022-03-13 ENCOUNTER — HOSPITAL ENCOUNTER (INPATIENT)
Age: 72
LOS: 3 days | Discharge: HOME HEALTH | DRG: 602 | End: 2022-03-16
Attending: EMERGENCY MEDICINE | Admitting: INTERNAL MEDICINE
Payer: MEDICARE

## 2022-03-13 DIAGNOSIS — S91.302A OPEN WOUND OF HEEL, LEFT, INITIAL ENCOUNTER: ICD-10-CM

## 2022-03-13 DIAGNOSIS — R33.9 URINARY RETENTION: Primary | ICD-10-CM

## 2022-03-13 DIAGNOSIS — L03.116 CELLULITIS OF LEFT LOWER EXTREMITY: ICD-10-CM

## 2022-03-13 PROBLEM — L02.416 CELLULITIS AND ABSCESS OF LEFT LOWER EXTREMITY: Status: ACTIVE | Noted: 2022-03-13

## 2022-03-13 LAB
ALBUMIN SERPL-MCNC: 3.8 G/DL (ref 3.4–5)
ALP BLD-CCNC: 106 U/L (ref 40–129)
ALT SERPL-CCNC: 22 U/L (ref 10–40)
AMORPHOUS: NORMAL /HPF
ANION GAP SERPL CALCULATED.3IONS-SCNC: 13 MMOL/L (ref 3–16)
AST SERPL-CCNC: 36 U/L (ref 15–37)
BASE EXCESS VENOUS: 0.3 MMOL/L (ref -2–3)
BASOPHILS ABSOLUTE: 0.1 K/UL (ref 0–0.2)
BASOPHILS RELATIVE PERCENT: 0.6 %
BILIRUB SERPL-MCNC: <0.2 MG/DL (ref 0–1)
BILIRUBIN DIRECT: <0.2 MG/DL (ref 0–0.3)
BILIRUBIN URINE: NEGATIVE
BILIRUBIN, INDIRECT: NORMAL MG/DL (ref 0–1)
BLOOD, URINE: NEGATIVE
BUN BLDV-MCNC: 8 MG/DL (ref 7–20)
C-REACTIVE PROTEIN: 27.3 MG/L (ref 0–5.1)
CALCIUM SERPL-MCNC: 9.2 MG/DL (ref 8.3–10.6)
CARBOXYHEMOGLOBIN: 2.7 % (ref 0–1.5)
CHLORIDE BLD-SCNC: 98 MMOL/L (ref 99–110)
CLARITY: CLEAR
CO2: 23 MMOL/L (ref 21–32)
COLOR: YELLOW
CREAT SERPL-MCNC: 0.5 MG/DL (ref 0.8–1.3)
EOSINOPHILS ABSOLUTE: 0.3 K/UL (ref 0–0.6)
EOSINOPHILS RELATIVE PERCENT: 2 %
EPITHELIAL CELLS, UA: NORMAL /HPF (ref 0–5)
GFR AFRICAN AMERICAN: >60
GFR NON-AFRICAN AMERICAN: >60
GLUCOSE BLD-MCNC: 93 MG/DL (ref 70–99)
GLUCOSE URINE: NEGATIVE MG/DL
HCO3 VENOUS: 26.2 MMOL/L (ref 24–28)
HCT VFR BLD CALC: 35.4 % (ref 40.5–52.5)
HEMOGLOBIN, VEN, REDUCED: 49.6 %
HEMOGLOBIN: 11.9 G/DL (ref 13.5–17.5)
KETONES, URINE: NEGATIVE MG/DL
LACTIC ACID, SEPSIS: 2.2 MMOL/L (ref 0.4–1.9)
LEUKOCYTE ESTERASE, URINE: NEGATIVE
LIPASE: 20 U/L (ref 13–60)
LYMPHOCYTES ABSOLUTE: 1.3 K/UL (ref 1–5.1)
LYMPHOCYTES RELATIVE PERCENT: 9.3 %
MCH RBC QN AUTO: 32.6 PG (ref 26–34)
MCHC RBC AUTO-ENTMCNC: 33.6 G/DL (ref 31–36)
MCV RBC AUTO: 97.1 FL (ref 80–100)
METHEMOGLOBIN VENOUS: 0.3 % (ref 0–1.5)
MICROSCOPIC EXAMINATION: NORMAL
MONOCYTES ABSOLUTE: 0.8 K/UL (ref 0–1.3)
MONOCYTES RELATIVE PERCENT: 6.1 %
NEUTROPHILS ABSOLUTE: 11.4 K/UL (ref 1.7–7.7)
NEUTROPHILS RELATIVE PERCENT: 82 %
NITRITE, URINE: NEGATIVE
O2 SAT, VEN: 49 %
PCO2, VEN: 46.9 MMHG (ref 41–51)
PDW BLD-RTO: 13.1 % (ref 12.4–15.4)
PH UA: 6.5 (ref 5–8)
PH VENOUS: 7.36 (ref 7.35–7.45)
PLATELET # BLD: 324 K/UL (ref 135–450)
PMV BLD AUTO: 7.4 FL (ref 5–10.5)
PO2, VEN: <30 MMHG (ref 25–40)
POTASSIUM REFLEX MAGNESIUM: 4.5 MMOL/L (ref 3.5–5.1)
PROTEIN UA: NEGATIVE MG/DL
RBC # BLD: 3.65 M/UL (ref 4.2–5.9)
RBC UA: NORMAL /HPF (ref 0–4)
SEDIMENTATION RATE, ERYTHROCYTE: 52 MM/HR (ref 0–20)
SODIUM BLD-SCNC: 134 MMOL/L (ref 136–145)
SPECIFIC GRAVITY UA: 1.02 (ref 1–1.03)
TCO2 CALC VENOUS: 28 MMOL/L
TOTAL PROTEIN: 7.3 G/DL (ref 6.4–8.2)
URINE TYPE: NORMAL
UROBILINOGEN, URINE: 0.2 E.U./DL
WBC # BLD: 13.8 K/UL (ref 4–11)
WBC UA: NORMAL /HPF (ref 0–5)

## 2022-03-13 PROCEDURE — 83605 ASSAY OF LACTIC ACID: CPT

## 2022-03-13 PROCEDURE — 51798 US URINE CAPACITY MEASURE: CPT

## 2022-03-13 PROCEDURE — 83690 ASSAY OF LIPASE: CPT

## 2022-03-13 PROCEDURE — 96365 THER/PROPH/DIAG IV INF INIT: CPT

## 2022-03-13 PROCEDURE — 36415 COLL VENOUS BLD VENIPUNCTURE: CPT

## 2022-03-13 PROCEDURE — 82803 BLOOD GASES ANY COMBINATION: CPT

## 2022-03-13 PROCEDURE — 51702 INSERT TEMP BLADDER CATH: CPT

## 2022-03-13 PROCEDURE — 99283 EMERGENCY DEPT VISIT LOW MDM: CPT

## 2022-03-13 PROCEDURE — 85025 COMPLETE CBC W/AUTO DIFF WBC: CPT

## 2022-03-13 PROCEDURE — 86140 C-REACTIVE PROTEIN: CPT

## 2022-03-13 PROCEDURE — 80076 HEPATIC FUNCTION PANEL: CPT

## 2022-03-13 PROCEDURE — 6360000002 HC RX W HCPCS: Performed by: EMERGENCY MEDICINE

## 2022-03-13 PROCEDURE — 70450 CT HEAD/BRAIN W/O DYE: CPT

## 2022-03-13 PROCEDURE — 80048 BASIC METABOLIC PNL TOTAL CA: CPT

## 2022-03-13 PROCEDURE — 2580000003 HC RX 258: Performed by: EMERGENCY MEDICINE

## 2022-03-13 PROCEDURE — 85652 RBC SED RATE AUTOMATED: CPT

## 2022-03-13 PROCEDURE — 71045 X-RAY EXAM CHEST 1 VIEW: CPT

## 2022-03-13 PROCEDURE — 81001 URINALYSIS AUTO W/SCOPE: CPT

## 2022-03-13 PROCEDURE — 73630 X-RAY EXAM OF FOOT: CPT

## 2022-03-13 PROCEDURE — 1200000000 HC SEMI PRIVATE

## 2022-03-13 RX ADMIN — VANCOMYCIN HYDROCHLORIDE 1000 MG: 10 INJECTION, POWDER, LYOPHILIZED, FOR SOLUTION INTRAVENOUS at 23:04

## 2022-03-13 RX ADMIN — CEFTRIAXONE 1000 MG: 1 INJECTION, POWDER, FOR SOLUTION INTRAMUSCULAR; INTRAVENOUS at 22:22

## 2022-03-13 ASSESSMENT — ENCOUNTER SYMPTOMS
NAUSEA: 0
VOMITING: 0
BACK PAIN: 0
CHEST TIGHTNESS: 0
SHORTNESS OF BREATH: 0
ABDOMINAL PAIN: 0

## 2022-03-13 NOTE — ED PROVIDER NOTES
[address] [facility] [address] ENCOUNTER          PHYSICIAN ASSISTANT NOTE       Date of evaluation: 3/13/2022    Chief Complaint     Altered Mental Status (brought in by son d/t aid said increased AMS, apparently patient self medicating with anxiety meds ?? names per son, - patient states took [address] and Restoril pta patient states one of each, patient A&O x 4) and Other (aid thought patient could not urinate, upon arrival depends soaked in urine)    History of Present Illness     Dianne Ward is a 67 y.o. male with a past medical history of enlarged prostate, TBI, known ulcer of the left heel presenting to the emergency department for confusion and concerns for urinary tract infection. He was brought by his son, patient lives by himself but has 24-hour nursing care, recently discharged from inpatient rehab. Patient said that he has not been able to urinate since last night, although currently he does have a wet diaper. He has no abdominal pain, no nausea or vomiting. Patient son said that he has been intermittently confused over the last few weeks it has worsened today. Main concern is if he has another urinary tract infection today. No recent falls, trauma or injury. No chest pain or shortness of breath, no fevers or chills. In addition, patient son said that he has been self-medicating for anxiety with previously prescribed medications as well as over-the-counter medications. Per son, he this afternoon he took Tanzeum and Restoril, patient said he took 1 of each. Since taking medications, he has been sleepy. Review of Systems     Review of Systems   Constitutional: Negative for chills, diaphoresis, fatigue and fever. Respiratory: Negative for chest tightness and shortness of breath. Cardiovascular: Negative for chest pain, palpitations and leg swelling. Gastrointestinal: Negative for abdominal pain, nausea and vomiting.    Genitourinary: Positive for difficulty urinating. Negative for flank pain, hematuria and urgency. Musculoskeletal: Negative for back pain and myalgias. Skin: Negative for rash. Neurological: Negative for dizziness, weakness and headaches. Psychiatric/Behavioral: Negative for confusion. Past Medical, Surgical, Family, and Social History     He has a past medical history of Enlarged prostate, Heel ulcer ([facility] [address] and Subdural hemorrhage following injury ([facility] [address] He has no past surgical history on file. His family history is not on file. He reports that he has been smoking cigarettes. He has never used smokeless tobacco. He reports current alcohol use. Medications     Previous Medications    ACETAMINOPHEN (TYLENOL) 500 MG TABLET    Take 1 tablet by mouth 4 times daily as needed for Pain    FOLIC ACID (FOLVITE) 1 MG TABLET    Take 1 tablet by mouth daily    LOPERAMIDE (IMODIUM) 2 MG CAPSULE    Take 2 mg by mouth 4 times daily as needed for Diarrhea    MULTIPLE VITAMIN (MULTIVITAMIN) TABS TABLET    Take 1 tablet by mouth daily    TAMSULOSIN (FLOMAX) 0.4 MG CAPSULE    Take 1 capsule by mouth daily    THIAMINE MONONITRATE (THIAMINE) 100 MG TABLET    Take 1 tablet by mouth daily       Allergies     He has No Known Allergies. Physical Exam     INITIAL VITALS: BP: (!) 186/91, Temp: 98.4 °F (36.9 °C), Pulse: 86, Resp: 16, SpO2: 98 %  Physical Exam  Vitals and nursing note reviewed. Constitutional:       General: He is not in acute distress. Appearance: He is well-developed and normal weight. He is not ill-appearing. HENT:      Head: Normocephalic and atraumatic. Cardiovascular:      Rate and Rhythm: Normal rate and regular rhythm. Heart sounds: Normal heart sounds. No murmur heard. No friction rub. Pulmonary:      Effort: Pulmonary effort is normal. No respiratory distress. Breath sounds: Normal breath sounds. Abdominal:      General: There is no distension. Palpations: Abdomen is soft. There is no mass. Tenderness: There is no abdominal tenderness. There is no guarding. Comments: Wet depends   Musculoskeletal:         General: Normal range of motion. Comments: Erythema with 1+ pitting edema of the left lower leg with superficial ulceration of the left heel, no purulence, good surrounding granulation tissue and wound borders, no associated warmth, intact distal pulses   Neurological:      Mental Status: He is alert and oriented to person, place, and time. Cranial Nerves: No cranial nerve deficit, dysarthria or facial asymmetry. Sensory: No sensory deficit.    Psychiatric:         Mood and Affect: Mood normal.         Speech: Speech normal.         Behavior: Behavior normal.         Diagnostic Results     RADIOLOGY:  XR FOOT LEFT (MIN 3 VIEWS)    (Results Pending)   XR CHEST PORTABLE    (Results Pending)   CT HEAD WO CONTRAST    (Results Pending)       LABS:   Results for orders placed or performed during the hospital encounter of 03/13/22   Urinalysis with Microscopic   Result Value Ref Range    Color, UA Yellow Straw/Yellow    Clarity, UA Clear Clear    Glucose, Ur Negative Negative mg/dL    Bilirubin Urine Negative Negative    Ketones, Urine Negative Negative mg/dL    Specific Gravity, UA 1.020 1.005 - 1.030    Blood, Urine Negative Negative    pH, UA 6.5 5.0 - 8.0    Protein, UA Negative Negative mg/dL    Urobilinogen, Urine 0.2 <2.0 E.U./dL    Nitrite, Urine Negative Negative    Leukocyte Esterase, Urine Negative Negative    Microscopic Examination Not Indicated     Urine Type NotGiven     WBC, UA None seen 0 - 5 /HPF    RBC, UA None seen 0 - 4 /HPF    Epithelial Cells, UA 0-1 0 - 5 /HPF    Amorphous, UA Rare /HPF   CBC with Auto Differential   Result Value Ref Range    WBC 13.8 (H) 4.0 - 11.0 K/uL    RBC 3.65 (L) 4.20 - 5.90 M/uL    Hemoglobin 11.9 (L) 13.5 - 17.5 g/dL    Hematocrit 35.4 (L) 40.5 - 52.5 %    MCV 97.1 80.0 - 100.0 fL    MCH 32.6 26.0 - 34.0 pg    MCHC 33.6 31.0 - 36.0 g/dL RDW 13.1 12.4 - 15.4 %    Platelets 429 461 - 311 K/uL    MPV 7.4 5.0 - 10.5 fL    Neutrophils % 82.0 %    Lymphocytes % 9.3 %    Monocytes % 6.1 %    Eosinophils % 2.0 %    Basophils % 0.6 %    Neutrophils Absolute 11.4 (H) 1.7 - 7.7 K/uL    Lymphocytes Absolute 1.3 1.0 - 5.1 K/uL    Monocytes Absolute 0.8 0.0 - 1.3 K/uL    Eosinophils Absolute 0.3 0.0 - 0.6 K/uL    Basophils Absolute 0.1 0.0 - 0.2 K/uL   Basic Metabolic Panel w/ Reflex to MG   Result Value Ref Range    Sodium 134 (L) 136 - 145 mmol/L    Potassium reflex Magnesium 4.5 3.5 - 5.1 mmol/L    Chloride 98 (L) 99 - 110 mmol/L    CO2 23 21 - 32 mmol/L    Anion Gap 13 3 - 16    Glucose 93 70 - 99 mg/dL    BUN 8 7 - 20 mg/dL    CREATININE 0.5 (L) 0.8 - 1.3 mg/dL    GFR Non-African American >60 >60    GFR African American >60 >60    Calcium 9.2 8.3 - 10.6 mg/dL   Hepatic Function Panel   Result Value Ref Range    Total Protein 7.3 6.4 - 8.2 g/dL    Albumin 3.8 3.4 - 5.0 g/dL    Alkaline Phosphatase 106 40 - 129 U/L    ALT 22 10 - 40 U/L    AST 36 15 - 37 U/L    Total Bilirubin <0.2 0.0 - 1.0 mg/dL    Bilirubin, Direct <0.2 0.0 - 0.3 mg/dL    Bilirubin, Indirect see below 0.0 - 1.0 mg/dL   Lipase   Result Value Ref Range    Lipase 20.0 13.0 - 60.0 U/L   Lactate, Sepsis   Result Value Ref Range    Lactic Acid, Sepsis 2.2 (H) 0.4 - 1.9 mmol/L   Blood gas, venous (Lab)   Result Value Ref Range    pH, Marcos 7.356 7.350 - 7.450    pCO2, Marcos 46.9 41.0 - 51.0 mmHg    pO2, Marcos <30.0 25.0 - 40.0 mmHg    HCO3, Venous 26.2 24.0 - 28.0 mmol/L    Base Excess, Marcos 0.3 -2.0 - 3.0 mmol/L    O2 Sat, Marcos 49 Not established %    Carboxyhemoglobin 2.7 (H) 0.0 - 1.5 %    MetHgb, Marcos 0.3 0.0 - 1.5 %    TC02 (Calc), Marcos 28 mmol/L    Hemoglobin, Marcos, Reduced 49.60 %   C-Reactive Protein   Result Value Ref Range    CRP 27.3 (H) 0.0 - 5.1 mg/L       ED BEDSIDE ULTRASOUND:  None    RECENT VITALS:  BP: (!) 186/91, Temp: 98.4 °F (36.9 °C), Pulse: 86, Resp: 16, SpO2: 98 %     Procedures None    ED Course     Nursing Notes, Past Medical Hx,Past Surgical Hx, Social Hx, Allergies, and Family Hx were reviewed. The patient was given the following medications:  No orders of the defined types were placed in this encounter. CONSULTS:  None    MEDICAL DECISION MAKING / ASSESSMENT / PLAN     Larisa Shahid is a 67 y.o. male presenting to the emergency department from home with his son. Son is concerned about urinary retention with mild confusion. Vitals were stable upon arrival and he was afebrile. Bladder scan showed 1000 cc of urine. Loew catheter was subsequently placed, urinalysis showed no evidence of urinary tract infection. He does have a history of enlarged prostate which is likely causing obstruction and retention. No associated acute kidney injury with normal creatinine today. Patient did have a leukocytosis of 13.8 with findings suggestive of cellulitis of his left lower leg. He has known pressure ulcer of his heel with no expressible purulence. Patient's home health nurse was concerned that he may have cellulitis with progressive redness and swelling over the last few days of the left lower leg. Inflammatory markers ordered, CRP returned slightly elevated at 27.3, sed rate is pending. Also x-ray of the left leg is pending    Patient is alert and oriented, although reportedly taking OTC medications for anxiety which may be contributing to mild confusion tonight. With hx of TBI, CT Head ordered, results also pending. I anticipate that patient will require admission for cellulitis of LLE after pending labs and imaging studies result. My attending will follow up on ultimate disposition of the patient. Clinical Impression     1. Urinary retention    2. Cellulitis of left lower extremity      Disposition     PATIENT REFERRED TO:  No follow-up provider specified.     DISCHARGE MEDICATIONS:  New Prescriptions    No medications on file     DISPOSITION Oneyda Jackson PA-C  03/13/22 2108

## 2022-03-14 ENCOUNTER — APPOINTMENT (OUTPATIENT)
Dept: GENERAL RADIOLOGY | Age: 72
DRG: 602 | End: 2022-03-14
Payer: MEDICARE

## 2022-03-14 LAB
ANION GAP SERPL CALCULATED.3IONS-SCNC: 9 MMOL/L (ref 3–16)
BASOPHILS ABSOLUTE: 0.1 K/UL (ref 0–0.2)
BASOPHILS RELATIVE PERCENT: 0.5 %
BUN BLDV-MCNC: 7 MG/DL (ref 7–20)
CALCIUM SERPL-MCNC: 9.2 MG/DL (ref 8.3–10.6)
CHLORIDE BLD-SCNC: 104 MMOL/L (ref 99–110)
CO2: 26 MMOL/L (ref 21–32)
CREAT SERPL-MCNC: <0.5 MG/DL (ref 0.8–1.3)
EOSINOPHILS ABSOLUTE: 0.5 K/UL (ref 0–0.6)
EOSINOPHILS RELATIVE PERCENT: 4.2 %
GFR AFRICAN AMERICAN: >60
GFR NON-AFRICAN AMERICAN: >60
GLUCOSE BLD-MCNC: 94 MG/DL (ref 70–99)
HCT VFR BLD CALC: 31.1 % (ref 40.5–52.5)
HEMOGLOBIN: 10.7 G/DL (ref 13.5–17.5)
LACTIC ACID, SEPSIS: 1.3 MMOL/L (ref 0.4–1.9)
LYMPHOCYTES ABSOLUTE: 1.2 K/UL (ref 1–5.1)
LYMPHOCYTES RELATIVE PERCENT: 10.1 %
MCH RBC QN AUTO: 33 PG (ref 26–34)
MCHC RBC AUTO-ENTMCNC: 34.4 G/DL (ref 31–36)
MCV RBC AUTO: 96 FL (ref 80–100)
MONOCYTES ABSOLUTE: 0.9 K/UL (ref 0–1.3)
MONOCYTES RELATIVE PERCENT: 7.1 %
NEUTROPHILS ABSOLUTE: 9.3 K/UL (ref 1.7–7.7)
NEUTROPHILS RELATIVE PERCENT: 78.1 %
PDW BLD-RTO: 13 % (ref 12.4–15.4)
PLATELET # BLD: 305 K/UL (ref 135–450)
PMV BLD AUTO: 7.2 FL (ref 5–10.5)
POTASSIUM REFLEX MAGNESIUM: 4 MMOL/L (ref 3.5–5.1)
PREALBUMIN: 15.4 MG/DL (ref 20–40)
RBC # BLD: 3.24 M/UL (ref 4.2–5.9)
SODIUM BLD-SCNC: 139 MMOL/L (ref 136–145)
TSH REFLEX: 3.04 UIU/ML (ref 0.27–4.2)
VITAMIN B-12: 491 PG/ML (ref 211–911)
VITAMIN D 25-HYDROXY: 13 NG/ML
WBC # BLD: 11.9 K/UL (ref 4–11)

## 2022-03-14 PROCEDURE — 82306 VITAMIN D 25 HYDROXY: CPT

## 2022-03-14 PROCEDURE — 6360000002 HC RX W HCPCS

## 2022-03-14 PROCEDURE — 85025 COMPLETE CBC W/AUTO DIFF WBC: CPT

## 2022-03-14 PROCEDURE — 87040 BLOOD CULTURE FOR BACTERIA: CPT

## 2022-03-14 PROCEDURE — 80048 BASIC METABOLIC PNL TOTAL CA: CPT

## 2022-03-14 PROCEDURE — 84443 ASSAY THYROID STIM HORMONE: CPT

## 2022-03-14 PROCEDURE — 6370000000 HC RX 637 (ALT 250 FOR IP): Performed by: STUDENT IN AN ORGANIZED HEALTH CARE EDUCATION/TRAINING PROGRAM

## 2022-03-14 PROCEDURE — 6370000000 HC RX 637 (ALT 250 FOR IP)

## 2022-03-14 PROCEDURE — 73610 X-RAY EXAM OF ANKLE: CPT

## 2022-03-14 PROCEDURE — 6360000002 HC RX W HCPCS: Performed by: STUDENT IN AN ORGANIZED HEALTH CARE EDUCATION/TRAINING PROGRAM

## 2022-03-14 PROCEDURE — 82607 VITAMIN B-12: CPT

## 2022-03-14 PROCEDURE — 2580000003 HC RX 258: Performed by: STUDENT IN AN ORGANIZED HEALTH CARE EDUCATION/TRAINING PROGRAM

## 2022-03-14 PROCEDURE — 73630 X-RAY EXAM OF FOOT: CPT

## 2022-03-14 PROCEDURE — 97530 THERAPEUTIC ACTIVITIES: CPT

## 2022-03-14 PROCEDURE — 97162 PT EVAL MOD COMPLEX 30 MIN: CPT

## 2022-03-14 PROCEDURE — 2580000003 HC RX 258

## 2022-03-14 PROCEDURE — 1200000000 HC SEMI PRIVATE

## 2022-03-14 PROCEDURE — 97166 OT EVAL MOD COMPLEX 45 MIN: CPT

## 2022-03-14 PROCEDURE — 84134 ASSAY OF PREALBUMIN: CPT

## 2022-03-14 PROCEDURE — 2500000003 HC RX 250 WO HCPCS: Performed by: STUDENT IN AN ORGANIZED HEALTH CARE EDUCATION/TRAINING PROGRAM

## 2022-03-14 PROCEDURE — 83605 ASSAY OF LACTIC ACID: CPT

## 2022-03-14 PROCEDURE — 36415 COLL VENOUS BLD VENIPUNCTURE: CPT

## 2022-03-14 RX ORDER — LABETALOL HYDROCHLORIDE 5 MG/ML
20 INJECTION, SOLUTION INTRAVENOUS EVERY 4 HOURS PRN
Status: DISCONTINUED | OUTPATIENT
Start: 2022-03-14 | End: 2022-03-16 | Stop reason: HOSPADM

## 2022-03-14 RX ORDER — SODIUM CHLORIDE 9 MG/ML
25 INJECTION, SOLUTION INTRAVENOUS PRN
Status: DISCONTINUED | OUTPATIENT
Start: 2022-03-14 | End: 2022-03-16 | Stop reason: HOSPADM

## 2022-03-14 RX ORDER — LORAZEPAM 0.5 MG/1
0.5 TABLET ORAL NIGHTLY PRN
Status: DISCONTINUED | OUTPATIENT
Start: 2022-03-14 | End: 2022-03-16 | Stop reason: HOSPADM

## 2022-03-14 RX ORDER — OXYCODONE HYDROCHLORIDE 5 MG/1
2.5 TABLET ORAL EVERY 6 HOURS PRN
Status: DISCONTINUED | OUTPATIENT
Start: 2022-03-14 | End: 2022-03-16 | Stop reason: HOSPADM

## 2022-03-14 RX ORDER — POLYETHYLENE GLYCOL 3350 17 G/17G
17 POWDER, FOR SOLUTION ORAL DAILY PRN
Status: DISCONTINUED | OUTPATIENT
Start: 2022-03-14 | End: 2022-03-16 | Stop reason: HOSPADM

## 2022-03-14 RX ORDER — ONDANSETRON 2 MG/ML
4 INJECTION INTRAMUSCULAR; INTRAVENOUS EVERY 6 HOURS PRN
Status: DISCONTINUED | OUTPATIENT
Start: 2022-03-14 | End: 2022-03-16 | Stop reason: HOSPADM

## 2022-03-14 RX ORDER — SODIUM CHLORIDE 9 MG/ML
INJECTION, SOLUTION INTRAVENOUS CONTINUOUS
Status: DISCONTINUED | OUTPATIENT
Start: 2022-03-14 | End: 2022-03-16 | Stop reason: HOSPADM

## 2022-03-14 RX ORDER — ACETAMINOPHEN 650 MG/1
650 SUPPOSITORY RECTAL EVERY 6 HOURS PRN
Status: DISCONTINUED | OUTPATIENT
Start: 2022-03-14 | End: 2022-03-16 | Stop reason: HOSPADM

## 2022-03-14 RX ORDER — TAMSULOSIN HYDROCHLORIDE 0.4 MG/1
0.4 CAPSULE ORAL DAILY
Status: DISCONTINUED | OUTPATIENT
Start: 2022-03-14 | End: 2022-03-14

## 2022-03-14 RX ORDER — LIDOCAINE 4 G/G
1 PATCH TOPICAL DAILY
Status: DISCONTINUED | OUTPATIENT
Start: 2022-03-14 | End: 2022-03-16 | Stop reason: HOSPADM

## 2022-03-14 RX ORDER — OXYCODONE HYDROCHLORIDE 5 MG/1
2.5 TABLET ORAL EVERY 4 HOURS PRN
Status: DISCONTINUED | OUTPATIENT
Start: 2022-03-14 | End: 2022-03-14

## 2022-03-14 RX ORDER — TEMAZEPAM 15 MG/1
30 CAPSULE ORAL NIGHTLY PRN
COMMUNITY

## 2022-03-14 RX ORDER — LOPERAMIDE HYDROCHLORIDE 2 MG/1
2 CAPSULE ORAL 4 TIMES DAILY PRN
Status: DISCONTINUED | OUTPATIENT
Start: 2022-03-14 | End: 2022-03-16 | Stop reason: HOSPADM

## 2022-03-14 RX ORDER — ASPIRIN 81 MG/1
81 TABLET, CHEWABLE ORAL DAILY
COMMUNITY

## 2022-03-14 RX ORDER — MULTIVITAMIN WITH IRON
1 TABLET ORAL DAILY
Status: DISCONTINUED | OUTPATIENT
Start: 2022-03-14 | End: 2022-03-16 | Stop reason: HOSPADM

## 2022-03-14 RX ORDER — TAMSULOSIN HYDROCHLORIDE 0.4 MG/1
0.4 CAPSULE ORAL ONCE
Status: COMPLETED | OUTPATIENT
Start: 2022-03-14 | End: 2022-03-14

## 2022-03-14 RX ORDER — ASPIRIN 81 MG/1
81 TABLET, CHEWABLE ORAL DAILY
Status: DISCONTINUED | OUTPATIENT
Start: 2022-03-14 | End: 2022-03-16 | Stop reason: HOSPADM

## 2022-03-14 RX ORDER — SODIUM CHLORIDE 0.9 % (FLUSH) 0.9 %
5-40 SYRINGE (ML) INJECTION PRN
Status: DISCONTINUED | OUTPATIENT
Start: 2022-03-14 | End: 2022-03-16 | Stop reason: HOSPADM

## 2022-03-14 RX ORDER — TAMSULOSIN HYDROCHLORIDE 0.4 MG/1
0.8 CAPSULE ORAL DAILY
COMMUNITY

## 2022-03-14 RX ORDER — ACETAMINOPHEN 325 MG/1
650 TABLET ORAL EVERY 6 HOURS PRN
Status: DISCONTINUED | OUTPATIENT
Start: 2022-03-14 | End: 2022-03-16 | Stop reason: HOSPADM

## 2022-03-14 RX ORDER — ATORVASTATIN CALCIUM 40 MG/1
40 TABLET, FILM COATED ORAL NIGHTLY
Status: DISCONTINUED | OUTPATIENT
Start: 2022-03-14 | End: 2022-03-16 | Stop reason: HOSPADM

## 2022-03-14 RX ORDER — ROSUVASTATIN CALCIUM 40 MG/1
40 TABLET, COATED ORAL DAILY
COMMUNITY

## 2022-03-14 RX ORDER — FOLIC ACID 1 MG/1
1 TABLET ORAL DAILY
Status: DISCONTINUED | OUTPATIENT
Start: 2022-03-14 | End: 2022-03-16 | Stop reason: HOSPADM

## 2022-03-14 RX ORDER — GAUZE BANDAGE 2" X 2"
100 BANDAGE TOPICAL DAILY
Status: DISCONTINUED | OUTPATIENT
Start: 2022-03-14 | End: 2022-03-16 | Stop reason: HOSPADM

## 2022-03-14 RX ORDER — SODIUM CHLORIDE 0.9 % (FLUSH) 0.9 %
5-40 SYRINGE (ML) INJECTION EVERY 12 HOURS SCHEDULED
Status: DISCONTINUED | OUTPATIENT
Start: 2022-03-14 | End: 2022-03-16 | Stop reason: HOSPADM

## 2022-03-14 RX ORDER — ONDANSETRON 4 MG/1
4 TABLET, ORALLY DISINTEGRATING ORAL EVERY 8 HOURS PRN
Status: DISCONTINUED | OUTPATIENT
Start: 2022-03-14 | End: 2022-03-16 | Stop reason: HOSPADM

## 2022-03-14 RX ORDER — PAROXETINE HYDROCHLORIDE 20 MG/1
40 TABLET, FILM COATED ORAL DAILY
Status: DISCONTINUED | OUTPATIENT
Start: 2022-03-14 | End: 2022-03-16 | Stop reason: HOSPADM

## 2022-03-14 RX ORDER — CLORAZEPATE DIPOTASSIUM 7.5 MG/1
7.5 TABLET ORAL NIGHTLY PRN
COMMUNITY

## 2022-03-14 RX ORDER — TAMSULOSIN HYDROCHLORIDE 0.4 MG/1
0.8 CAPSULE ORAL DAILY
Status: DISCONTINUED | OUTPATIENT
Start: 2022-03-15 | End: 2022-03-16 | Stop reason: HOSPADM

## 2022-03-14 RX ORDER — PAROXETINE HYDROCHLORIDE 40 MG/1
40 TABLET, FILM COATED ORAL EVERY MORNING
COMMUNITY

## 2022-03-14 RX ADMIN — LABETALOL HYDROCHLORIDE 20 MG: 5 INJECTION, SOLUTION INTRAVENOUS at 03:20

## 2022-03-14 RX ADMIN — LORAZEPAM 0.5 MG: 0.5 TABLET ORAL at 20:46

## 2022-03-14 RX ADMIN — ATORVASTATIN CALCIUM 40 MG: 40 TABLET, FILM COATED ORAL at 20:46

## 2022-03-14 RX ADMIN — CEFAZOLIN SODIUM 2000 MG: 10 INJECTION, POWDER, FOR SOLUTION INTRAVENOUS at 20:54

## 2022-03-14 RX ADMIN — ACETAMINOPHEN 650 MG: 325 TABLET ORAL at 20:48

## 2022-03-14 RX ADMIN — THIAMINE HCL TAB 100 MG 100 MG: 100 TAB at 08:37

## 2022-03-14 RX ADMIN — PAROXETINE HYDROCHLORIDE 40 MG: 20 TABLET, FILM COATED ORAL at 08:37

## 2022-03-14 RX ADMIN — ASPIRIN 81 MG: 81 TABLET, CHEWABLE ORAL at 08:37

## 2022-03-14 RX ADMIN — ENOXAPARIN SODIUM 40 MG: 100 INJECTION SUBCUTANEOUS at 08:36

## 2022-03-14 RX ADMIN — SODIUM CHLORIDE: 9 INJECTION, SOLUTION INTRAVENOUS at 02:58

## 2022-03-14 RX ADMIN — VANCOMYCIN HYDROCHLORIDE 1250 MG: 10 INJECTION, POWDER, LYOPHILIZED, FOR SOLUTION INTRAVENOUS at 14:50

## 2022-03-14 RX ADMIN — TAMSULOSIN HYDROCHLORIDE 0.4 MG: 0.4 CAPSULE ORAL at 08:37

## 2022-03-14 RX ADMIN — CEFAZOLIN SODIUM 2000 MG: 10 INJECTION, POWDER, FOR SOLUTION INTRAVENOUS at 13:24

## 2022-03-14 RX ADMIN — CEFAZOLIN SODIUM 2000 MG: 10 INJECTION, POWDER, FOR SOLUTION INTRAVENOUS at 05:37

## 2022-03-14 RX ADMIN — THERA TABS 1 TABLET: TAB at 08:37

## 2022-03-14 RX ADMIN — LOPERAMIDE HYDROCHLORIDE 2 MG: 2 CAPSULE ORAL at 20:46

## 2022-03-14 RX ADMIN — TAMSULOSIN HYDROCHLORIDE 0.4 MG: 0.4 CAPSULE ORAL at 13:25

## 2022-03-14 RX ADMIN — ATORVASTATIN CALCIUM 40 MG: 40 TABLET, FILM COATED ORAL at 02:57

## 2022-03-14 RX ADMIN — OXYCODONE HYDROCHLORIDE 2.5 MG: 5 TABLET ORAL at 14:33

## 2022-03-14 RX ADMIN — FOLIC ACID 1 MG: 1 TABLET ORAL at 08:36

## 2022-03-14 ASSESSMENT — PAIN SCALES - GENERAL
PAINLEVEL_OUTOF10: 3
PAINLEVEL_OUTOF10: 0
PAINLEVEL_OUTOF10: 0
PAINLEVEL_OUTOF10: 6
PAINLEVEL_OUTOF10: 0

## 2022-03-14 NOTE — PROGRESS NOTES
Clinical Pharmacy Progress Note  Medication History      List of of current medications patient is taking is complete. Home Medication list in EPIC updated to reflect changes noted below. Source of information:  patient interview (with son present / adding some information), Surescripts pharmacy fill history     Changes made to home medication list:   Medications removed (no longer taking): · Acetaminophen  · Thiamine  · Folic Acid     Medications added:   · Asprin  · Clorazepate  · Paroxetine  · Rosuvastatin  · Tamsulosin     Medication doses / instructions adjusted:   · Tamsulosin - takes 2 capsules daily  · Loperamide - ordered QID prn, pt states he takes twice in the morning and once at bedtime     Other notes:    On fill history, pt has filled several other meds in the past 1-2 months, but pt & son confirm these have all been stopped (Atorvastatin, Potassium chloride, Quetiapine, Gabapentin).      Please call with questions--  Thanks--  Desiree Parekh, PharmD, BCPS, [facility]  [id number] ([facility])   3/14/2022 12:54 PM      Current Outpatient Medications   Medication Instructions    aspirin 81 mg, Oral, DAILY    clorazepate (TRANXENE) 7.5 mg, Oral, NIGHTLY PRN    loperamide (IMODIUM) 2 mg, Oral, 4 TIMES DAILY PRN, Usually takes twice in the morning and once at bedtime    Multiple Vitamin (MULTIVITAMIN) TABS tablet 1 tablet, Oral, DAILY    PARoxetine (PAXIL) 40 mg, Oral, EVERY MORNING    rosuvastatin (CRESTOR) 40 mg, Oral, DAILY    tamsulosin (FLOMAX) 0.8 mg, Oral, DAILY    temazepam (RESTORIL) 30 mg, Oral, NIGHTLY PRN

## 2022-03-14 NOTE — PROGRESS NOTES
Occupational Therapy   Occupational Therapy Initial Assessment and Treatment    Date: 3/14/2022   Patient Name: Shawna Pandey  MRN: [id number]     : 1950    Date of Service: 3/14/2022    Discharge Recommendations:  Shawna Pandey scored a 13/24 on the AM-PAC ADL Inpatient form. Current research shows that an AM-PAC score of 17 or less is typically not associated with a discharge to the patient's home setting. Based on the patient's AM-PAC score and their current ADL deficits, it is recommended that the patient have 3-5 sessions per week of Occupational Therapy at d/c to increase the patient's independence. Please see assessment section for further patient specific details. If home,  HOME HEALTH CARE: LEVEL 1 STANDARD    - Initial home health evaluation to occur within 24-48 hours, in patient home   - Therapy to evaluate with goal of regaining prior level of functioning   - Therapy to evaluate if patient has [address] needs for personal care      If patient discharges prior to next session this note will serve as a discharge summary. Please see below for the latest assessment towards goals. OT Equipment Recommendations  Equipment Needed: Yes  Mobility Devices: ADL Assistive Devices  ADL Assistive Devices:  [facility])    Assessment   Performance deficits / Impairments: Decreased functional mobility ; Decreased ADL status; Decreased balance;Decreased safe awareness;Decreased endurance;Decreased ROM  Assessment: Admitted from home w/ AMS and concern for UTI. Per chart review, pt was discharged from SNF and discharged home w/ 24 hr caregivers (pt indicating he wished to leave the SNF). Pt appearing to be grossly deconditioned and requiring Mod A for transfers and increased assist w/ ADLs. Pt has wounds on BLE. Podiatry following - orders for NWB LLE. Attempted to educate pt in NWB status. Pt highly impulsive and demo no effort to comply w/ WB restrictions.  If discharged home, recommend continued 24 hr ASSIST, HHOT, and BSC (to limit standing/mobility on LLE). Continue per POC. Treatment Diagnosis: impaired ADLs/transfers  Decision Making: Medium Complexity  OT Education: OT Role;Plan of Care;Precautions; ADL Adaptive Strategies;Transfer Training  Patient Education: attempted education for NWB LLE - no learning  Barriers to Learning: cognition  REQUIRES OT FOLLOW UP: Yes  Activity Tolerance  Activity Tolerance: Treatment limited secondary to agitation  Safety Devices  Safety Devices in place: Yes  Type of devices: Nurse notified; Left in chair;Chair alarm in place;Call light within reach           Patient Diagnosis(es): The primary encounter diagnosis was Urinary retention. Diagnoses of Cellulitis of left lower extremity and Open wound of heel, left, initial encounter were also pertinent to this visit. has a past medical history of Enlarged prostate, Heel ulcer ([facility] [address] and Subdural hemorrhage following injury ([facility] [address] has no past surgical history on file. Treatment Diagnosis: impaired ADLs/transfers      Restrictions  Position Activity Restriction  Other position/activity restrictions: Per Podiatry Notes: NWB LLE; Up with assist    Subjective   General  Chart Reviewed: Yes  Additional Pertinent Hx: 67 y.o. M to ED w/ concerns for confusion and concerns for urinary tract infection. Hospital Course: L Foot: (-); CT Head: (-) acute; Podiatry Consult for heel wound LLE- NWB LLE. PMH: enlarged prostate, TBI, known ulcer of the left heel  Family / Caregiver Present: No  Referring Practitioner: Dr. Tere Myles  Diagnosis: Urinary Retention    Subjective  Subjective: In bed on entry. Agreeable to work with OT w/ mod encouragement. \"I'm not doing anything unless I know you are Medicare associated. \" Pt expressing concerns for cost given SNF placement and cost for care there. \"I can't do shit! \" When educated about NWB status, pt stating \"It needs to dry out.  Just put a boot on it and let's go. Let's go walking. \"    Patient Currently in Pain: No      Social/Functional History  Social/Functional History  Lives With: Alone  Type of Home:  (condo)  Home Layout: One level  Bathroom Shower/Tub: Walk-in shower  Bathroom Toilet: Standard (vanity next to toilet)  Bathroom Equipment: Shower chair  Bathroom Accessibility: Accessible  Home Equipment: [address]  Receives Help From:  (24 hr caregivers for past 4-5 days)  ADL Assistance: Needs assistance (reports assist for ADLs (urinating in depends - \"it's really embarassing\"))  Homemaking Assistance:  (private duty care since released from SNF)  Ambulation Assistance: Needs assistance (using walker)  Transfer Assistance: Needs assistance  Additional Comments: Per chart review, pt had a fall 2/14/22 w/ resultant subdural hematoma. Pt was [facility] to [facility] on 2/19 - when discharged home, pt has had private duty home care (24 hr care). Pt reports he was living independent prior to that fall. Objective   Vision: Impaired  Vision Exceptions: Wears glasses for reading  Hearing: Within functional limits      Orientation  Overall Orientation Status:  (oriented to self, location. ? full orientation to situation.)        Balance  Sitting Balance: Stand by assistance  Standing Balance: Moderate assistance (post LOB)    Functional Mobility  Functional - Mobility Device: Rolling Walker  Activity: Other (~3-4' bed to chair)  Functional Mobility Comments: using wh walker; pt attempting to ambulate further despite education for NWB status and directions for bed to chair. Pt standing w/ full flat foot on floor for transfer. Toilet Transfers  Toilet - Technique: Stand step (using walker)  Equipment Used: Standard bedside commode (simulated w/ bedside chair)  Toilet Transfer:  Moderate assistance  Toilet Transfers Comments: Note: non-compliant w/ NWB status - no effort to maintain    ADL  Grooming: Minimal assistance (Min/Mod A - limited shoulder flexion; with head flexed forward able to touch face, unable to reach back of head)  Toileting:  (incontinent)     Coordination  Movements Are Fluid And Coordinated: Yes  Quality of Movement Other  Comment: limited B shoulder ROM (baseline)        Bed mobility  Supine to Sit:  (Min assist x1, CGA x1, VC's required for use of rail)  Scooting: Stand by assistance     Transfers  Stand Step Transfers: Moderate assistance  Sit to stand: Moderate assistance  Stand to sit: Moderate assistance  Transfer Comments: Note: attempted to educate pt in NWB status - pt non-compliant and not attempting to follow. Initially, pt stating \"I can put weight on my toes. \" (then proceeded to put full weight through foot). Cognition  Overall Cognitive Status: Exceptions  Following Commands: Follows one step commands with repetition  Attention Span: Attends with cues to redirect; Difficulty attending to directions (highly distracted - sending messages/texts and talking on phone when attempting to educate pt)  Memory: Decreased short term memory (? awareness of recent timeline of events)  Safety Judgement: Decreased awareness of need for safety;Decreased awareness of need for assistance  Problem Solving: Decreased awareness of errors  Insights: Decreased awareness of deficits  Initiation: Requires cues for some  Sequencing: Requires cues for some           Sensation  Overall Sensation Status: Impaired (reporting decreased sensation BUEs/BLEs)        LUE AROM (degrees)  LUE General AROM: shoulder flex 0, elbow to hand WFL  RUE AROM (degrees)  RUE General AROM: shoulder flex 0, elbow to hand WFL               Pt seen by OT for eval and treat.  Treatment included: bed mobility, functional transfers, pt education            Plan   Plan  Times per week: 2-5  Times per day: Daily  Current Treatment Recommendations: [facility] Education & Training,Cognitive Reorientation,Equipment Evaluation, Education, & procurement,Self-Care / ADL                                                 AM-PAC Score        AM-PAC Inpatient Daily Activity Raw Score: 13 (03/14/22 1541)  AM-PAC Inpatient ADL T-Scale Score : 32.03 (03/14/22 1541)  ADL Inpatient CMS 0-100% Score: 63.03 (03/14/22 1541)  ADL Inpatient CMS G-Code Modifier : CL (03/14/22 1541)    Goals  Short term goals  Time Frame for Short term goals: Discharge  Short term goal 1: simulated BSC transfer w/ Min A, NWB LLE  Short term goal 2: chair pushups x 10, NWB LLE to increase functional strength for transfers  Short term goal 3: tolerate LB dressing assessment  Patient Goals   Patient goals : to walk       Therapy Time   Individual Concurrent Group Co-treatment   Time In 1427         Time Out 1505         Minutes 38              Timed Code Treatment Minutes:   23    Total Treatment Minutes:  Charity BLUER/L #[id number]

## 2022-03-14 NOTE — CARE COORDINATION
CM following, pt from home with 24 hour care giver. Pt will need PT OT evals for DC recs, refused this AM.   On IV ABX pending bld cx and wound cx, consult to Pod for LLE heel wound. Spoke with Shahida Mckeon [phone] pt was at [facility] was not happy there so doesn't want to go back, they are set up with 24 hour nursing care with [facility], son has added PT OT to services after this admission. They are not interest in SNF but would be interested in  [facility] with [facility] vs [facility]. Will wait for PT OT evals and send referrals if appropriate.   Electronically signed by Chet Sotelo RN on 3/14/2022 at 11:36 AM   [phone]

## 2022-03-14 NOTE — H&P
Internal Medicine  PGY-2  History & Physical      CC: Acute encephalopathy    HistoryObtained From:  patient, family member - son    HISTORY OF PRESENT ILLNESS:  Cesar Enriquez is a 67 y.o. male with medical history of BPH, TBI, AAA and HLD who presents with altered mental status. Patient was recently admitted to [facility] on 2/14/2022 for a mechanical fall with subsequent subdural hematoma as a result. No neurosurgical intervention occurred. Patient was discharged on 2/19/2022 to SNF where he completed his rehabilitation and was then discharged home with 24 hour care. Patient's physical conditioning and mentation has been slowly improving. Today, patient's son got a call from his caretaker reporting around noon he became confused and was only oriented to himself. The caretaker was concerned that the patient was also developing an urinary tract infection as he had similar symptoms of suprapubic pain, increased urgency and inability to urinate. Patient's son also states that the patient has been started on Paxil for his anxiety and depression as well as Restoril PRN as a sleep aid. According to his caretaker, he has been taking all his prescribed medication. Patient denies any dysuria, fever, chills, chest pain, shortness of breath, abdominal pain, constipation, diarrhea, recent trauma, falls, cough, or palpitations. ED course:   Vitals: Hypertensive (186/91), afebrile, saturating 98% on RA  Exam: Left heel ulcer with surrounding erythema, warmth and edema. No fluctuance or drainage. Labs: Leukocytosis (13.8) with left shift, lactic acidosis (2.2), elevated ESR (52) & CRP (27.3)  Imaging: CT head, CXR & LLE XR (-). Bedside ultrasound (-) for abscess.   Intervention: Ceftriaxone & vancomycin x1    Past Medical History:        Diagnosis Date    Enlarged prostate     Heel ulcer ([address]     left    Subdural hemorrhage following injury ([address]     fall- went to [facility]       Past Surgical History:    History reviewed. No pertinent surgical history. Unable to obtain, patient encephalopathic  Medications Prior to Admission:    Not in a hospital admission. ·   Allergies:  Patient has no known allergies. Social History:   · TOBACCO: reports that he has been smoking cigarettes. He has never used smokeless tobacco.  · ETOH:   reports current alcohol use. · DRUGS : None  · Patient currently lives alone    Family History:   History reviewed. No pertinent family history. Unable to obtain, patient encephalopathic  Review of Systems: A 10 point review of systems was conducted, significant findings as noted in HPI. Unable to obtain, patient encephalopathic  Physical Exam  Constitutional:       Appearance: Normal appearance. He is normal weight. HENT:      Head: Normocephalic and atraumatic. Mouth/Throat:      Mouth: Mucous membranes are dry. Pharynx: Oropharynx is clear. Eyes:      Extraocular Movements: Extraocular movements intact. Conjunctiva/sclera: Conjunctivae normal.      Pupils: Pupils are equal, round, and reactive to light. Cardiovascular:      Rate and Rhythm: Normal rate and regular rhythm. Pulses: Normal pulses. Heart sounds: Normal heart sounds. Pulmonary:      Effort: Pulmonary effort is normal.      Breath sounds: Normal breath sounds. Abdominal:      General: Abdomen is flat. Bowel sounds are normal.      Palpations: Abdomen is soft. Musculoskeletal:         General: No tenderness. Normal range of motion. Cervical back: Normal range of motion and neck supple. Right lower leg: Edema (1+) present. Left lower leg: Edema (1+) present. Skin:     General: Skin is warm. Capillary Refill: Capillary refill takes less than 2 seconds. Findings: Erythema (Left heel) and wound (Left heel ulcer with negative flutuance or drainage) present. Neurological:      Mental Status: He is alert. He is disoriented.              Vitals:    03/13/22 2300   BP: (!) 174/89   Pulse:    Resp:    Temp:    SpO2: 96%       DATA:    Labs:  BMP:   Recent Labs     03/13/22 1953   *   K 4.5   CL 98*   CO2 23   BUN 8   CREATININE 0.5*   GLUCOSE 93     CBC:   Recent Labs     03/13/22 1953   WBC 13.8*   HGB 11.9*   HCT 35.4*          LFT's:   Recent Labs     03/13/22 1953   AST 36   ALT 22   BILITOT <0.2   ALKPHOS 106     Troponin: No results for input(s): TROPONINI in the last 72 hours. BNP: No results for input(s): BNP in the last 72 hours. ABGs: No results for input(s): PHART, JXH8BAB, PO2ART in the last 72 hours. INR: No results for input(s): INR in the last 72 hours. U/A:  Recent Labs     03/13/22 1953   COLORU Yellow   PHUR 6.5   WBCUA None seen   RBCUA None seen   CLARITYU Clear   SPECGRAV 1.020   LEUKOCYTESUR Negative   UROBILINOGEN 0.2   BILIRUBINUR Negative   BLOODU Negative   GLUCOSEU Negative   AMORPHOUS Rare       XR FOOT LEFT (MIN 3 VIEWS)   Final Result   None. Left foot:      FINDINGS: AP, lateral, and oblique views of the left foot were obtained. Alignment is anatomic. No fracture or dislocation. No bony erosion. Soft tissues are unremarkable. IMPRESSION:   1. Normal radiographs of the left foot. CHEST:      FINDINGS: Portable AP view of the chest was obtained. Lungs are clear. Cardiomediastinal contours are normal. No pleural effusion or pneumothorax. There are 2 remote right-sided rib fractures of the right sixth and seventh ribs. IMPRESSION:   1. No acute findings in the chest.      XR CHEST PORTABLE   Final Result   None. Left foot:      FINDINGS: AP, lateral, and oblique views of the left foot were obtained. Alignment is anatomic. No fracture or dislocation. No bony erosion. Soft tissues are unremarkable. IMPRESSION:   1. Normal radiographs of the left foot. CHEST:      FINDINGS: Portable AP view of the chest was obtained. Lungs are clear.  Cardiomediastinal contours are normal. No pleural effusion or pneumothorax. There are 2 remote right-sided rib fractures of the right sixth and seventh ribs. IMPRESSION:   1. No acute findings in the chest.      CT HEAD WO CONTRAST   Final Result   1. No acute intracranial findings. 2. Previous chronic appearing right frontal subdural hematoma has resolved. ASSESSMENT AND PLAN:  Shawna Pandey is a 67 y.o. male who presents with acute metabolic encephalopathy in setting of left lower extremity cellulitis. Left Lower Extremity Cellulitis  - (+) erythema, edema, calor and tenderness  - (-) fluctuance, drainage, ultrasound for abscess formation  - WBC 13.8 (left shift), CRP 27.3, ESR 52  - Vancomycin  - Blood and wound cultures pending    Acute Metabolic Encephalopathy 2/2 Cellulitis   - See treatment plan above  - Check TSH, B12, and vitamin D    Lactic Acidosis  - Elevated to 2.2 > ???  - Trend lactic acid q2h  - NS at 100 cc/hr    Physical Deconditioning   - PT/OT    Mixed Anxiety & Depressive Disorder  - Continue home Paxil   - Hold home Restoril     Abdominal Aortic Aneurysm  - CTA Abdomen/Pelvis (2/8/2021): AAA measuring 3.9 x 3.6 cm compared to 3.7 x 3.5 cm in 2019  - Continue home Lipitor & ASA    Code Status: Limited x4  FEN: IVF: NS; Adult general diet  PPX: Lovenox  DISPO: GMF      This patient will be discussed with attending, Misa Ceron DO. Carolina Nickerson MD, PGY-2  3/14/2022,  12:00 AM    I saw the patient independently from the resident . I discussed the care with the resident. I personally reviewed the HPI, PH, FH, SH, ROS and medications. I repeated pertinent portions of the examination and reviewed the relevant imaging and laboratory data. I agree with the findings, assessment and plan as documented.  addition to: Plan as above

## 2022-03-14 NOTE — PROGRESS NOTES
Patient alert and oriented x4. Lowe in place draining adequate yellow urine with no issues. Patient with multiple wounds on bilat lower extremities, wound care and podiatry consulted and dressed. Patient has taken dressing off multiple times stating, \"they are hot and that wounds should air out\". RN has discussed the importance of keeping dressings in place and patient states understanding but still does not want dressings in place. Tolerating diet with no issues. Pain controlled with PO prn pain medication. Patient denies any other needs at this time. Bed is in the lowest position, call light and bedside table within reach. Patient bed alarm is on. Will continue to monitor for changes in patient status.      Electronically signed by Dian Farias RN on 3/14/2022 at 6:25 PM

## 2022-03-14 NOTE — PROGRESS NOTES
Physician Progress Note      Lola Hidalgo  CSN #:                  [id number]  :                       1950  ADMIT DATE:       3/13/2022 7:04 PM  [address] [place] DATE:  RESPONDING  PROVIDER #:        SHINE VAZHAPPILLY          QUERY TEXT:    Pt admitted with cellulitis. Pt noted to have elevated WBC, elevated CRP, and   altered mental status. If possible, please document in the progress notes and   discharge summary if you are evaluating and /or treating any of the following: The medical record reflects the following:  Risk Factors: 67year old male  Clinical Indicators: On admission: WBC 13.8, CRP 27.3. Per IM H&P- acute   metabolic encephalopathy 2/2 cellulitis  Treatment: IV Ancef, Rocephin, and vancomycin; labs, imaging, monitoring,   supportive care    Thank you,  Tacos CARMENN RN CDS  Options provided:  -- Sepsis, present on admission  -- Cellulitis without Sepsis  -- Sepsis was ruled out  -- Other - I will add my own diagnosis  -- Disagree - Not applicable / Not valid  -- Disagree - Clinically unable to determine / Unknown  -- Refer to Clinical Documentation Reviewer    PROVIDER RESPONSE TEXT:    After further study, sepsis was ruled out for this patient. Query created by:  Chuy Morley on 3/14/2022 1:13 PM      Electronically signed by:  Ranjit Guadalupe 3/14/2022 1:18 PM

## 2022-03-14 NOTE — PROGRESS NOTES
Progress Note    Admit Date: 3/13/2022  Day: 2  Diet: ADULT DIET; Regular    CC: AMS. Interval history:     Patient seen at bedside, no acute events overnight. VS significant for mild hypertension, 's. Other VS stable. He is AOx4. He denies L heel pain. On ROS, he denies acute complains including fever/chills, n/v, abdominal pain, acute urinary or bowel abnormalities. He denies chest pain, palpitations, SOB. HPI:     Aden Abdalla is a 67 y.o. male with medical history of BPH, TBI, AAA and HLD who presents with altered mental status. Patient was recently admitted to [facility] on 2/14/2022 for a mechanical fall with subsequent subdural hematoma as a result. No neurosurgical intervention occurred. Patient was discharged on 2/19/2022 to SNF where he completed his rehabilitation and was then discharged home with 24 hour care. Patient's physical conditioning and mentation has been slowly improving. Today, patient's son got a call from his caretaker reporting around noon he became confused and was only oriented to himself. The caretaker was concerned that the patient was also developing an urinary tract infection as he had similar symptoms of suprapubic pain, increased urgency and inability to urinate. Patient's son also states that the patient has been started on Paxil for his anxiety and depression as well as Restoril PRN as a sleep aid. According to his caretaker, he has been taking all his prescribed medication. Patient denies any dysuria, fever, chills, chest pain, shortness of breath, abdominal pain, constipation, diarrhea, recent trauma, falls, cough, or palpitations.     ED course:   Vitals: Hypertensive (186/91), afebrile, saturating 98% on RA  Exam: Left heel ulcer with surrounding erythema, warmth and edema. No fluctuance or drainage. Labs: Leukocytosis (13.8) with left shift, lactic acidosis (2.2), elevated ESR (52) & CRP (27.3)  Imaging: CT head, CXR & LLE XR (-). Bedside ultrasound (-) for abscess. Intervention: Ceftriaxone & vancomycin x1    Medications:     Scheduled Meds:   folic acid  1 mg Oral Daily    multivitamin  1 tablet Oral Daily    tamsulosin  0.4 mg Oral Daily    vitamin B-1  100 mg Oral Daily    sodium chloride flush  5-40 mL IntraVENous 2 times per day    enoxaparin  40 mg SubCUTAneous Daily    atorvastatin  40 mg Oral Nightly    aspirin  81 mg Oral Daily    PARoxetine  40 mg Oral Daily    ceFAZolin  2,000 mg IntraVENous Q8H     Continuous Infusions:   sodium chloride      sodium chloride 100 mL/hr at 03/14/22 0258     PRN Meds:sodium chloride flush, sodium chloride, ondansetron **OR** ondansetron, polyethylene glycol, acetaminophen **OR** acetaminophen, labetalol    Objective:   Vitals:   T-max:  Patient Vitals for the past 8 hrs:   BP Temp Temp src Pulse Resp SpO2   03/14/22 0721 (!) 149/74 98 °F (36.7 °C) Axillary 65 16 92 %   03/14/22 0537 137/74   66     03/14/22 0321 (!) 156/94        03/14/22 0301 (!) 182/92 98 °F (36.7 °C) Oral 74 16 94 %   03/14/22 0057 (!) 169/86 98.2 °F (36.8 °C) Oral 79 17 95 %       Intake/Output Summary (Last 24 hours) at 3/14/2022 0852  Last data filed at 3/14/2022 0539  Gross per 24 hour   Intake    Output 3800 ml   Net -3800 ml       Review of Systems  Per Interval hx. Physical Exam  Constitutional:       Appearance: Normal appearance. HENT:      Head: Normocephalic and atraumatic. Nose: Nose normal.      Mouth/Throat:      Mouth: Mucous membranes are dry. Pharynx: Oropharynx is clear. Eyes:      Extraocular Movements: Extraocular movements intact. Conjunctiva/sclera: Conjunctivae normal.   Cardiovascular:      Rate and Rhythm: Normal rate and regular rhythm. Pulses: Normal pulses. Heart sounds: Normal heart sounds. Pulmonary:      Effort: Pulmonary effort is normal.      Breath sounds: Normal breath sounds.    Abdominal:      General: Bowel sounds are normal. Palpations: Abdomen is soft. Musculoskeletal:         General: Normal range of motion. Cervical back: Normal range of motion and neck supple. Skin:     Comments: L heel with mild erythema, warmth, tenderness. Neurological:      General: No focal deficit present. Mental Status: He is alert and oriented to person, place, and time. Mental status is at baseline. LABS:    CBC:   Recent Labs     03/13/22 1953 03/14/22  0550   WBC 13.8* 11.9*   HGB 11.9* 10.7*   HCT 35.4* 31.1*    305   MCV 97.1 96.0     Renal:    Recent Labs     03/13/22 1953 03/14/22  0550   * 139   K 4.5 4.0   CL 98* 104   CO2 23 26   BUN 8 7   CREATININE 0.5* <0.5*   GLUCOSE 93 94   CALCIUM 9.2 9.2   ANIONGAP 13 9     Hepatic:   Recent Labs     03/13/22 1953   AST 36   ALT 22   BILITOT <0.2   BILIDIR <0.2   PROT 7.3   LABALBU 3.8   ALKPHOS 106     Troponin: No results for input(s): TROPONINI in the last 72 hours. BNP: No results for input(s): BNP in the last 72 hours. Lipids: No results for input(s): CHOL, HDL in the last 72 hours. Invalid input(s): LDLCALCU, TRIGLYCERIDE  ABGs:  No results for input(s): PHART, LBG6OCE, PO2ART, XJX7AAP, BEART, THGBART, C3SSRLPM, EXL5RGT in the last 72 hours. INR: No results for input(s): INR in the last 72 hours. Lactate: No results for input(s): LACTATE in the last 72 hours. Cultures:  -----------------------------------------------------------------  RAD:   XR FOOT LEFT (MIN 3 VIEWS)   Final Result   None. Left foot:      FINDINGS: AP, lateral, and oblique views of the left foot were obtained. Alignment is anatomic. No fracture or dislocation. No bony erosion. Soft tissues are unremarkable. IMPRESSION:   1. Normal radiographs of the left foot. CHEST:      FINDINGS: Portable AP view of the chest was obtained. Lungs are clear. Cardiomediastinal contours are normal. No pleural effusion or pneumothorax.  There are 2 remote right-sided rib fractures of the right sixth and seventh ribs. IMPRESSION:   1. No acute findings in the chest.      XR CHEST PORTABLE   Final Result   None. Left foot:      FINDINGS: AP, lateral, and oblique views of the left foot were obtained. Alignment is anatomic. No fracture or dislocation. No bony erosion. Soft tissues are unremarkable. IMPRESSION:   1. Normal radiographs of the left foot. CHEST:      FINDINGS: Portable AP view of the chest was obtained. Lungs are clear. Cardiomediastinal contours are normal. No pleural effusion or pneumothorax. There are 2 remote right-sided rib fractures of the right sixth and seventh ribs. IMPRESSION:   1. No acute findings in the chest.      CT HEAD WO CONTRAST   Final Result   1. No acute intracranial findings. 2. Previous chronic appearing right frontal subdural hematoma has resolved. Assessment/Plan:     Nba Dowling is a 67 y.o. male who presents with acute metabolic encephalopathy in setting of left lower extremity cellulitis.     Left Lower Extremity Cellulitis  Mild erythema and tenderness. No appreciable temperature difference, swelling. No fluctuance, drainage; XR L foot clear, US - for abscess formation. Afebrile on arrival; WBC 13.8 (left shift), CRP 27.3, ESR 52. S/p Vanc, CTX in ED.  - continues Ancef  - f/u blood, wound cultures     Acute Metabolic Encephalopathy 2/2 LLE Cellulitis   - treat as above  - f/u TSH, B12, Vit D     Physical Deconditioning   - PT/OT     Lactic Acidosis - Resolved  Elevated to 2.2 on arrival. S/p 1L LR.     Chronic Issues:  Mixed Anxiety & Depressive Disorder - continue Paxil, holding home Restoril   AAA - AAA measuring 3.9 x 3.6 cm on CT Feb 2022 compared to 3.7 x 3.5 cm in 2019; continue ASA, Lipitor        Code Status: Limited x4  FEN: Adult Diet; General   PPX: Lovenox  DISPO: Manny Newman MD, PGY-1  03/14/22  8:52 AM    This patient has been staffed and discussed with Dr. Reno Severin, MD.

## 2022-03-14 NOTE — DISCHARGE INSTR - COC
Continuity of Care Form    Patient Name: Omid Nolan   :  1950  MRN:  [id number]    Admit date:  3/13/2022  Discharge date:  ***    Code Status Order: Limited   Advance Directives:      Admitting Physician:  Rylie Elena DO  PCP: Kristin Blair MD    Discharging Nurse: [facility] Unit/Room#: 1003/0758-72  Discharging Unit Phone Number: ***    Emergency Contact:   Extended Emergency Contact Information  Primary Emergency Contact: [address] [place] Phone: [phone]  Relation: Child    Past Surgical History:  History reviewed. No pertinent surgical history.     Immunization History:   Immunization History   Administered Date(s) Administered    Tdap (Boostrix, Adacel) 2021       Active Problems:  Patient Active Problem List   Diagnosis Code    AMS (altered mental status) R41.82    Cellulitis and abscess of left lower extremity L03.116, L02.416       Isolation/Infection:   Isolation            No Isolation          Patient Infection Status       Infection Onset Added Last Indicated Last Indicated By Review Planned Expiration Resolved Resolved By    None active    Resolved    C-diff Rule Out 21 Clostridium difficile toxin/antigen (Ordered)   10/03/21 Pepito Juan, RN            Nurse Assessment:  Last Vital Signs: /74   Pulse 73   Temp 97.6 °F (36.4 °C) (Oral)   Resp 16   Ht 5' 7\" (1.702 m)   Wt 140 lb 4.8 oz (63.6 kg)   SpO2 97%   BMI 21.97 kg/m²     Last documented pain score (0-10 scale): Pain Level: 6  Last Weight:   Wt Readings from Last 1 Encounters:   22 140 lb 4.8 oz (63.6 kg)     Mental Status:  {IP PT MENTAL STATUS:}    IV Access:  { TOM IV ACCESS:596126677}    Nursing Mobility/ADLs:  Walking   {P DME XYDN:524707497}  Transfer  {P DME FYFL:181933382}  Bathing  {CHP DME EUQQ:706759254}  Dressing  {P DME RPNH:161672216}  Toileting  {P DME JWMY:237887799}  Feeding  {P DME YDSB:816760340}  Med Admin  {[facility] DME YTKN:154258632}  Med Delivery   [facility] PDVHGOKU:854924574}    Wound Care Documentation and Therapy:  Wound 03/14/22 Heel Left (Active)   Wound Image   03/14/22 1012   Wound Etiology Pressure Unstageable 03/14/22 1012   Dressing Status Dry; Intact 03/14/22 1012   Wound Cleansed Cleansed with saline 03/14/22 1012   Offloading for Diabetic Foot Ulcers Offloading boot 03/14/22 1012   Wound Length (cm) 3.6 cm 03/14/22 1012   Wound Width (cm) 4 cm 03/14/22 1012   Wound Depth (cm) 0 cm 03/14/22 1012   Wound Surface Area (cm^2) 14.4 cm^2 03/14/22 1012   Wound Volume (cm^3) 0 cm^3 03/14/22 1012   Wound Assessment Dry;Pink/red 03/14/22 1012   Drainage Amount None 03/14/22 1012   Odor None 03/14/22 1012   Isis-wound Assessment Dry/flaky 03/14/22 1012   Margins Defined edges 03/14/22 1012   Number of days: 0       Wound 03/14/22 Toe (Comment  which one) Right 2nd Toe (Active)   Wound Image   03/14/22 1012   Wound Etiology Arterial 03/14/22 1012   Dressing Status New drainage noted 03/14/22 1012   Wound Cleansed Cleansed with saline 03/14/22 1012   Offloading for Diabetic Foot Ulcers Offloading boot 03/14/22 1012   Wound Length (cm) 0.8 cm 03/14/22 1012   Wound Width (cm) 0.7 cm 03/14/22 1012   Wound Depth (cm) 0.2 cm 03/14/22 1012   Wound Surface Area (cm^2) 0.56 cm^2 03/14/22 1012   Wound Volume (cm^3) 0.112 cm^3 03/14/22 1012   Undermining Starts ___ O'Clock 3 03/14/22 1012   Undermining Ends___ O'Clock 9 03/14/22 1012   Undermining Maxium Distance (cm) 0.3 03/14/22 1012   Wound Assessment Pink/red 03/14/22 1012   Drainage Amount Scant 03/14/22 1012   Drainage Description Purulent 03/14/22 1012   Odor None 03/14/22 1012   Isis-wound Assessment Blanchable erythema;Dry/flaky;Edematous 03/14/22 1012   Margins Defined edges 03/14/22 1012   Wound Thickness Description not for Pressure Injury Full thickness 03/14/22 1012   Number of days: 0       Wound 03/14/22 Ankle Right;Medial (Active)   Wound Image   03/14/22 1012 Wound Etiology Arterial 03/14/22 1012   Dressing Status Dry; Intact 03/14/22 1012   Wound Cleansed Cleansed with saline 03/14/22 1012   Offloading for Diabetic Foot Ulcers Offloading boot 03/14/22 1012   Wound Length (cm) 0.9 cm 03/14/22 1012   Wound Width (cm) 1.2 cm 03/14/22 1012   Wound Depth (cm) 0 cm 03/14/22 1012   Wound Surface Area (cm^2) 1.08 cm^2 03/14/22 1012   Wound Volume (cm^3) 0 cm^3 03/14/22 1012   Wound Assessment Eschar dry 03/14/22 1012   Drainage Amount None 03/14/22 1012   Odor None 03/14/22 1012   Isis-wound Assessment Dry/flaky 03/14/22 1012   Margins Attached edges; Defined edges 03/14/22 1012   Number of days: 0       Wound 03/14/22 Ankle Left;Medial (Active)   Wound Image   03/14/22 1012   Wound Etiology Arterial 03/14/22 1012   Dressing Status Dry; Intact 03/14/22 1012   Wound Cleansed Cleansed with saline 03/14/22 1012   Offloading for Diabetic Foot Ulcers Offloading boot 03/14/22 1012   Wound Length (cm) 0.6 cm 03/14/22 1012   Wound Width (cm) 0.7 cm 03/14/22 1012   Wound Depth (cm) 0.1 cm 03/14/22 1012   Wound Surface Area (cm^2) 0.42 cm^2 03/14/22 1012   Wound Volume (cm^3) 0.042 cm^3 03/14/22 1012   Wound Assessment Dry;Slough 03/14/22 1012   Drainage Amount None 03/14/22 1012   Odor None 03/14/22 1012   Isis-wound Assessment Blanchable erythema;Dry/flaky 03/14/22 1012   Margins Attached edges; Defined edges 03/14/22 1012   Number of days: 0       Wound 03/14/22 Leg Right; Lower; Lateral (Active)   Wound Image   03/14/22 1012   Wound Etiology Traumatic 03/14/22 1012   Dressing Status Dry; Intact 03/14/22 1012   Wound Cleansed Cleansed with saline 03/14/22 1012   Dressing/Treatment Betadine swabs/povidone iodine; Foam 03/14/22 1012   Dressing Change Due 03/15/22 03/14/22 1012   Wound Length (cm) 2.5 cm 03/14/22 1012   Wound Width (cm) 1.5 cm 03/14/22 1012   Wound Depth (cm) 0 cm 03/14/22 1012   Wound Surface Area (cm^2) 3.75 cm^2 03/14/22 1012   Wound Volume (cm^3) 0 cm^3 03/14/22 1012 Wound Assessment Eschar dry 22 1012   Drainage Amount None 22 1012   Odor None 22 1012   Isis-wound Assessment Blanchable erythema;Dry/flaky 22 1012   Margins Attached edges; Defined edges 22 1012   Number of days: 0        Elimination:  Continence:    Bowel: {YES / UQ:34803}  Bladder: {YES / DH:57144}  Urinary Catheter: {Urinary Catheter:662819136}   Colostomy/Ileostomy/Ileal Conduit: {YES / QO:95722}       Date of Last BM: ***    Intake/Output Summary (Last 24 hours) at 3/14/2022 1535  Last data filed at 3/14/2022 1159  Gross per 24 hour   Intake 480 ml   Output 4600 ml   Net -4120 ml     I/O last 3 completed shifts:  In: -   Out: 3800 [Urine:3800]    Safety Concerns:     [address] Safety Concerns:355433640}    Impairments/Disabilities:      [address] Impairments/Disabilities:755945069}    Nutrition Therapy:  Current Nutrition Therapy:   [address] Diet List:283682486}    Routes of Feeding: {CHP DME Other Feedings:607506972}  Liquids: {Slp liquid thickness:89025}  Daily Fluid Restriction: {CHP DME Yes amt example:434996277}  Last Modified Barium Swallow with Video (Video Swallowing Test): {Done Not Done CYEX:830386056}    Treatments at the Time of Hospital Discharge:   Respiratory Treatments: ***  Oxygen Therapy:  {Therapy; copd oxygen:13095}  Ventilator:    { CC Vent QNWO:598276053}    Rehab Therapies: {THERAPEUTIC INTERVENTION:3667519369}  Weight Bearing Status/Restrictions: [address] [employer] Weight Bearin}  Other Medical Equipment (for information only, NOT a DME order):  {EQUIPMENT:015287036}  Other Treatments: ***    Patient's personal belongings (please select all that are sent with patient):  {CHP DME Belongings:827616313}    RN SIGNATURE:  {Esignature:511295130}    CASE MANAGEMENT/SOCIAL WORK SECTION    Inpatient Status Date: ***    Readmission Risk Assessment Score:  Readmission Risk              Risk of Unplanned Readmission:  13           Discharging to Facility/ Agency   Name: Address:  Phone:  Fax:    Dialysis Facility (if applicable)   Name:  Address:  Dialysis Schedule:  Phone:  Fax:    / signature: {Esignature:250909891}    PHYSICIAN SECTION    Prognosis: {Prognosis:3058797887}    Condition at Discharge: Sergio Escobar Patient Condition:887579011}    Rehab Potential (if transferring to Rehab): {Prognosis:8697978174}    Recommended Labs or Other Treatments After Discharge: ***    Podiatry wound care instructions:  Please change bilateral lower extremity dressings on Monday, Wednesday, and Friday consisting of:  1. Cover all wounds with Adaptic/nonadherent gauze  2. Cover all wounds with dry and sterile gauze  3. Gently wrap Kerlix from toes to knee    Patient is nonweightbearing to the left lower extremity and weightbearing as tolerated to the right lower extremity. Please call and schedule a follow-up appointment with the [facility]. podiatry clinic with 1 week of hospital discharge for wound recheck. Patient is for Prevalon boots at all times while in bed to prevent further deep tissue injury. Physician Certification: I certify the above information and transfer of Larisa Shahid  is necessary for the continuing treatment of the diagnosis listed and that he requires {Admit to Appropriate Level of Care:03330} for {GREATER/LESS:282019839} 30 days.      Update Admission H&P: {CHP DME Changes in KUKFR:363989416}    PHYSICIAN SIGNATURE:  {Esignature:764951818}

## 2022-03-14 NOTE — PROGRESS NOTES
Physical Therapy    Facility/Department: [address] [place] [address] 5 [facility]  Initial Assessment and Treatment     NAME: Halie Madden  : 1950  MRN: [id number]    Date of Service: 3/14/2022    Discharge Recommendations: Halie Madden scored a 13/24 on the AM-PAC short mobility form. Current research shows that an AM-PAC score of 17 or less is typically not associated with a discharge to the patient's home setting. Based on the patient's AM-PAC score and their current functional mobility deficits, it is recommended that the patient have 3-5 sessions per week of Physical Therapy at d/c to increase the patient's independence. Please see assessment section for further patient specific details. If patient discharges prior to next session this note will serve as a discharge summary. Please see below for the latest assessment towards goals. PT Equipment Recommendations  Equipment Needed: Yes (Wheelchair with L foot raiser- if d/c'd home)    Assessment   Body structures, Functions, Activity limitations: Decreased functional mobility ; Decreased strength;Decreased safe awareness;Decreased endurance;Decreased balance;Decreased coordination;Decreased posture  Assessment: Pt from home presenting with AMS. Pt recently d/c from SNF following a subdural hematoma and was having 24-hr aides assist upon returning home. Pt requires encouragement to work with therapy and clarification that he will not be billed by insurance. Pt has decreased independent mobility from baseline. Pt normally ambulates independently without AD before his subdural hematoma and was requiring assist and walker upon returning home. Pt currently requires mod/CGA x1 with transfers/gt and walker. Pt states he was in a wheelchair for 10 days and his legs became significantly weak during that time. Pt is unable to maintain L LE NWB status during transfers/gt with walker. Pt is a fall risk and not safe to stand alone.  Safety conerns for pt returning home however would benefit from SNF placement to improve mobility and independence. If pt returns home, would benefit from 24-hr supervision and home PT to improve functional mobility. Pt would benefit from skilled PT to improve strength and independence. Treatment Diagnosis: Decreased strength and mobility associated with inactivity  Prognosis: Good  Decision Making: Medium Complexity  PT Education: Goals;PT Role;Plan of Care;General Safety;Weight-bearing Education; Functional Mobility Training  Patient Education: Pt educated on role of PT and WB restrictions. Pt will require reinforcement. REQUIRES PT FOLLOW UP: Yes  Activity Tolerance  Activity Tolerance: Pt limited by inability to maintained L LE NWB       Patient Diagnosis(es): The primary encounter diagnosis was Urinary retention. Diagnoses of Cellulitis of left lower extremity and Open wound of heel, left, initial encounter were also pertinent to this visit. has a past medical history of Enlarged prostate, Heel ulcer ([address] and Subdural hemorrhage following injury ([facility] [address] has no past surgical history on file. Restrictions  Position Activity Restriction  Other position/activity restrictions: Per Podiatry Notes: NWB LLE; Up with assist  Vision/Hearing  Vision: Impaired  Vision Exceptions: Wears glasses for reading  Hearing: Within functional limits     Subjective  General  Chart Reviewed: Yes  Additional Pertinent Hx: Mr. Antonino Martin is a 68 y/o male presenting with AMS and concerns for UTI. Pt was dx with urinary retention. CT-head: (-) acute, previous chronic appearing right frontal subdural hematoma has resolved. XR-chest: (-) acute. XR-L ankle: Skin defect on the medial ankle and heel without radiographic evidence of osteomyelitis. XR-R ankle: Skin defects and soft tissue swelling over the lateral malleolus without radiographic evidence of osteomyelitis. XR-R foot: Multifocal skin defects without radiographic evidence of osteomyelitis.  XR-L foot: (-) acute. PMH: enlarged prostate, heel ulcer, subdural hemorrhage. Family / Caregiver Present: No  Referring Practitioner: Sienna Cat PA-C  Referral Date : 03/13/22  Diagnosis: Urinary retention  Subjective  Subjective: Pt presents lying supine in bed. Pt agreeable to PT with encouragment. Pt states \"I'm not paying for this if it's not covered\". Pain Screening  Patient Currently in Pain: No  Vital Signs  Patient Currently in Pain: No       Orientation  Orientation  Overall Orientation Status: Within Functional Limits  Social/Functional History  Social/Functional History  Lives With: Alone  Type of Home:  (condo)  Home Layout: One level  Bathroom Shower/Tub: Walk-in shower  Bathroom Toilet: Standard (vanity next to toilet)  Bathroom Equipment: Shower chair  Bathroom Accessibility: Accessible  Home Equipment: Rolling walker,Alert Button,Hospital bed  Receives Help From:  (24 hr caregivers for past 4-5 days)  ADL Assistance: Needs assistance (reports assist for ADLs (urinating in depends - \"it's really embarassing\"))  Homemaking Assistance:  (private duty care since released from SNF)  Ambulation Assistance: Needs assistance (using walker)  Transfer Assistance: Needs assistance  Additional Comments: Per chart review, pt had a fall 2/14/22 w/ resultant subdural hematoma. Pt was discharged to [facility] on 2/19 - when discharged home, pt has had private duty home care (24 hr care). Pt reports he was living independent prior to that fall.   Cognition        Objective          AROM RLE (degrees)  RLE AROM: WFL  AROM LLE (degrees)  LLE AROM : WFL  Strength RLE  Strength RLE: WFL  Comment: Global weakness assessed to be at least 3/5 as observed with functional mobility  Strength LLE  Strength LLE: WFL  Comment: Global weakness assessed to be at least 3/5 as observed with functional mobility        Bed mobility  Supine to Sit:  (Min assist x1, CGA x1, VC's required for use of rail)  Scooting: Stand by assistance  Transfers  Sit to Stand: Moderate Assistance (With walker, VC's required for LLE NWB status)  Stand to sit: Moderate Assistance (Required for controlled descent, pt holds onto walker during descent)  Bed to Chair: Moderate assistance (Stand pivot with walker, max VC's for L LE NWB)  Ambulation  Ambulation?: Yes  Ambulation 1  Device: [address]: Moderate assistance  Quality of Gait: Unsteady, guarded posture, no overt LOB, downward gaze  Gait Deviations: Slow Jesenia;Decreased step length;Decreased step height  Distance: 3-4 steps  Comments: Pt unable to maintain L LE NWB status despite constant VC's and education. Pt may be able to tolerate NWB heel only. Impulsive with mobility requiring VC's to slow down and wait for therapy assistance.      Balance  Posture: Fair  Sitting - Static: Good (SBA)  Sitting - Dynamic: Good (SBA)  Standing - Static: Fair (Mod assist x1 with walker)  Standing - Dynamic: Fair (Mod assist x1 with walker, unable to maintain L LE NWB)        Plan   Plan  Times per week: 2-5  Current Treatment Recommendations: Strengthening,Balance Training,Functional Mobility Training,Transfer Training,Gait Training,Endurance Training,Wheelchair Mobility Training,Neuromuscular Re-education,Home Exercise Program,Safety Education & Training,Patient/Caregiver Education & Training  Plan Comment: Further assess gait pending ability to maintain WB restricitions  Safety Devices  Type of devices: Left in chair,Call light within reach,Chair alarm in place,Nurse notified    G-Code       OutComes Score                                                  AM-PAC Score  AM-PAC Inpatient Mobility Raw Score : 13 (03/14/22 1510)  AM-PAC Inpatient T-Scale Score : 36.74 (03/14/22 1510)  Mobility Inpatient CMS 0-100% Score: 64.91 (03/14/22 1510)  Mobility Inpatient CMS G-Code Modifier : CL (03/14/22 1510)          Goals  Short term goals  Time Frame for Short term goals: D/C  Short term goal 1: Perform supine to sit independently  Short term goal 2: Perform STS transfer with CGA, walker, and maintaining L LE NWB  Short term goal 3: Perform bed to chair transfer with CGA, walker, and maintaining L LE NWB  Patient Goals   Patient goals : To return home       Therapy Time   Individual Concurrent Group Co-treatment   Time In 1430         Time Out 1509         Minutes 39               Timed Code Treatment Minutes:   24    Total Treatment Minutes:  [facility]     Therapist was present, directed the patient's care, made skilled judgement, and was responsible for assessment and treatment of the patient.   Oralee Console, [address] [place] [address]

## 2022-03-14 NOTE — ED PROVIDER NOTES
ED Attending Attestation Note     Date of evaluation: 3/13/2022    This patient was seen by the advance practice provider. I have seen and examined the patient, agree with the workup, evaluation, management and diagnosis. The care plan has been discussed. I have reviewed the ECG and concur with the ALEXANDR's interpretation. My assessment reveals adult male, somewhat somnolent, but arouses to stimulation, can provide very limited history, brought in with complaint of altered mental status, apparent decrease in functional capacity in the last couple of days to 72 hours. She also had some erythema developing around a wound on the left heel. I do not appreciate fluctuance, and bedside ultrasound demonstrates no signs of abscess formation. There is some erythema through the posterior aspect of the heel. Labs are revealing for mild to moderate elevation in inflammatory markers, mild leukocytosis, otherwise unremarkable. CT of the head shows resolution of his previous subdural, and chest x-ray shows no acute abnormality. I believe the patient's delirium and decrease in p.o. patient may be due to his cellulitis is developed on his left lower extremity, which we will treat with Rocephin and vancomycin for now. I discussed this with the patient and his son who accompanies him, and right now is delirium appears to be too much for the 24-hour aides whom he has at home to help with. I suspect this will likely get better though with resolution of his cellulitis. Subsequently, will like to bring patient to the hospital for treatment and improvement in his symptoms.        Saloni Moy MD  03/13/22 2200

## 2022-03-14 NOTE — CONSULTS
Department of Podiatry Consult Note  Resident       Reason for Consult:  R 2nd Toe, L Heel  Requesting Physician:  Pool Morrison MD    CHIEF COMPLAINT:  Bilateral leg wounds    HISTORY OF PRESENT ILLNESS:                The patient is a 67 y.o. male with significant past medical history of enlarged prostate, subdural hemorrhage, and heel ulceration who is consulted for right second toe and left heel ulceration. Patient presented to the hospital secondary to altered mental status as well as a concern for urinary tract infection, patient was then admit for cellulitis and abscess of the left lower extremity. Patient is unaware of how he obtained any of the wounds to his bilateral legs, per epic notes he has recently suffered numerous falls including a recent fall which caused a subdural hemorrhage. Patient states that his primary care physician performs local wound care for his bilateral lower extremity wounds, and he [facility] of [facility].  The patient denies pain to bilateral lower extremities. Patient states that over the last couple of weeks and months he has become deconditioned and does not ambulate very much. Patient denies nausea, vomiting, fever, chills, shortness breath, chest pain, calf pain at this time. Past Medical History:        Diagnosis Date    Enlarged prostate     Heel ulcer ([address]     left    Subdural hemorrhage following injury ([facility] [address]     fall- went to [facility]       Past Surgical History:    History reviewed. No pertinent surgical history. Allergies:   Patient has no known allergies. Medications:   Home Meds  No current facility-administered medications on file prior to encounter.      Current Outpatient Medications on File Prior to Encounter   Medication Sig Dispense Refill    acetaminophen (TYLENOL) 500 MG tablet Take 1 tablet by mouth 4 times daily as needed for Pain 28 tablet 1    tamsulosin (FLOMAX) 0.4 MG capsule Take 1 capsule by mouth daily 30 capsule 3    Multiple Vitamin (MULTIVITAMIN) TABS tablet Take 1 tablet by mouth daily 30 tablet 0    thiamine mononitrate (THIAMINE) 100 MG tablet Take 1 tablet by mouth daily 30 tablet 0    folic acid (FOLVITE) 1 MG tablet Take 1 tablet by mouth daily 90 tablet 1    loperamide (IMODIUM) 2 MG capsule Take 2 mg by mouth 4 times daily as needed for Diarrhea         Current Meds  folic acid (FOLVITE) tablet 1 mg, Daily  multivitamin 1 tablet, Daily  tamsulosin (FLOMAX) capsule 0.4 mg, Daily  thiamine mononitrate tablet 100 mg, Daily  sodium chloride flush 0.9 % injection 5-40 mL, 2 times per day  sodium chloride flush 0.9 % injection 5-40 mL, PRN  0.9 % sodium chloride infusion, PRN  enoxaparin (LOVENOX) injection 40 mg, Daily  ondansetron (ZOFRAN-ODT) disintegrating tablet 4 mg, Q8H PRN   Or  ondansetron (ZOFRAN) injection 4 mg, Q6H PRN  polyethylene glycol (GLYCOLAX) packet 17 g, Daily PRN  acetaminophen (TYLENOL) tablet 650 mg, Q6H PRN   Or  acetaminophen (TYLENOL) suppository 650 mg, Q6H PRN  labetalol (NORMODYNE;TRANDATE) injection 20 mg, Q4H PRN  0.9 % sodium chloride infusion, Continuous  atorvastatin (LIPITOR) tablet 40 mg, Nightly  aspirin chewable tablet 81 mg, Daily  PARoxetine (PAXIL) tablet 40 mg, Daily  ceFAZolin (ANCEF) 2000 mg in dextrose 5 % 50 mL IVPB, Q8H        Family History:   History reviewed. No pertinent family history. Social History:   TOBACCO:   reports that he has been smoking cigarettes. He has never used smokeless tobacco.  ETOH:   reports current alcohol use. DRUGS:   has no history on file for drug use. REVIEW OF SYSTEMS:    A 10 point review of systems was conducted, significant findings as noted in HPI. All other systems negative.       PHYSICAL EXAM:      Vitals:    BP (!) 124/59   Pulse 66   Temp 97.7 °F (36.5 °C) (Axillary)   Resp 20   Ht 5' 7\" (1.702 m)   Wt 140 lb 4.8 oz (63.6 kg)   SpO2 94%   BMI 21.97 kg/m²     LABS:   Recent Labs     03/13/22 1953 03/14/22  0550   WBC 13.8* 11.9*   HGB 11.9* 10.7*   HCT 35.4* 31.1*    305     Recent Labs     03/14/22  0550      K 4.0      CO2 26   BUN 7   CREATININE <0.5*     Recent Labs     03/13/22 1953   PROT 7.3         GENERAL: Patient is alert and oriented x3. No signs of acute distress noted. LOWER EXTREMITY EXAMINATION:  VASCULAR: DP and PT pulses are non palpable 0/4 b/l. CFT is brisk to the digits of the foot b/l. Upon hand-held Doppler examination, left DP is monophasic in nature. Left PT and right DP and PT are biphasic in nature skin temperature is warm to cool from proximal to distal with no focal calor noted. No edema noted. No pain with calf compression b/l. NEUROLOGIC: Gross and epicritic sensation is diminished b/l. Protective sensation is diminished at all pedal sites b/l. DERMATOLOGIC:   Diffuse dermatologic changes with multiple abrasions of numerous stages of healing noted to b/l LE. Toenails 1-5 b/l LE are thickened, yellow, and blackened likely 2/2 dried sanguinous drainage. specifically, left medial ankle has a full-thickness ulceration measuring approximately 1.5 cm in diameter with a fibrotic and granular tissue base. Mild periwound erythema noted to the left LE medial malleolus wound. The wound does not probe to bone, no purulent drainage, tracking, tunneling, crepitus, or fluctuance noted. Unstageable pressure ulceration noted to the left plantar medial heel, measuring approximately 3.5x4 cm with a combination of hyperkeratotic, granular, and fibrotic tissue base. Mild periwound erythema noted to the left heel wound. The wound does not probe to bone, no purulent drainage, tracking, tunneling, crepitus, or fluctuance noted. Full-thickness ulceration measuring approximately 2.5x1.5 cm with a fibrotic, xerotic, and granular tissue base noted to the lateral aspect of the proximal lower extremity at a prominently noted fibular head.  Mild periwound erythema noted to the wound. The wound does not probe to bone, no purulent drainage, tracking, tunneling, crepitus, or fluctuance noted. Full-thickness ulceration measuring approximately 1 cm in diameter with a fibrotic, hyperkeratotic, and granular tissue base noted to the right second digit. Erythema noted to the second digit from the metatarsal phalangeal joint extending distally. The wound does not probe to bone, no apparent drainage, tracking, tunneling, crepitus, fluctuance noted. Patient provided verbal consent for photos to be obtained today, 03/14/22                                MUSCULOSKELETAL: Muscle strength is 4/5 for all pedal groups tested. No pain with palpation of the foot or ankle b/l. Ankle joint ROM is decreased in dorsiflexion with the knee extended. No obvious biomechanical abnormalities. IMAGING:  Impression       Left foot:       FINDINGS: AP, lateral, and oblique views of the left foot were obtained. Alignment is anatomic. No fracture or dislocation. No bony erosion. Soft tissues are unremarkable. ASSESSMENT/PLAN:   1.  Full-thickness ulceration, right second digit (POA)  2.  Full-thickness ulceration, right proximal fibula (POA)  3.  Full-thickness ulceration, left medial malleolus (POA)  4. Pressure ulceration, left heel - NPUAP stage IV  5. Pressure injury, right heel - NPUAP stage I  6. Numerous abrasions of numerous stages of healing b/l LE  7. Cellulitis, right second digit    -Patient seen and examined at the bedside this a.m.  -VSS, leukocytosis noted (WBC 11.9)  -ESR 52 and CRP 27.3  -lactic acid 1.3  -prealbumin pending  -Blood cultures 1 & 2 pending  -XR left foot images reviewed, impression noted above  -XR images left ankle, right foot and ankle ordered, will follow up results  -Wound culture not obtained at this time 2/2 no active drainage  -Continue IV antibiotics per primary  -b/l LE dressed with wet to dry, gauze and Kerlix with tape  -Prevalon boots ordered. Patient is to wear at all times while in bed to prevent further deep tissue injury.  -Non weightbearing to left LE, full weightbearing to the right LE  -Lengthy discussion with patient regarding the importance of extremity elevation and edema control, patient voiced understanding  -Lengthy discussion with patient regarding infection of the right second digit and concern for osteomyelitis pending XR images, discussed with patient conservative treatment consisting of IV antibiotics versus surgical treatment of digit amputation. Patient refusing amputation at this time      DISPO: Numerous ulcerations to bilateral lower extremities with multiple stages of healing; will follow up with all labs and images for further evaluation and treatment plan      Thank you for the opportunity to take part in the patient's care.  The patient assessment and plan was discussed with Dr. Isis Patton DPM  Podiatric Resident PGY3  Pager [phone] or Stephen  3/14/2022, 12:33 PM

## 2022-03-14 NOTE — CONSULTS
Clinical Pharmacy Progress Note    Vancomycin - Management by Pharmacy    Consult Date(s):  3/14/22  Consulting Provider(s):  Dr. Yuan Hinkle / Plan  1)  LLE cellulitis - Vancomycin   Concurrent Antimicrobials: Cefazolin    Day of Vanc Therapy: 1   Current Dosing Method: Bayesian-guided AUC  o Therapeutic Goal: -500 mg/L*hr  o Received 1000mg IV x1 in ED last night. Based on estimated kinetics, will start 1250mg IV q12h. o Will assess dose timing in AM tomorrow and will determine appropriate time for level then.  Will continue to monitor clinical condition and make adjustments to regimen as appropriate. Please call with questions--  Thanks--  Manual Bhargav, PharmD, BCPS, BCGP  [id number] ([facility])   3/14/2022 12:24 PM      Interval update:  AMS clearing this AM per notes. Vascular surgery consulted given multiple LE wounds. Subjective/Objective:   Alexis Angel is a 67 y.o. y/o male with a PMHx that includes BPH, subdural hemorrhage s/p fall, AAA, depression, anxiety, and HLD who is admitted with altered mental status - being treated for LLE cellulitis and urinary retention. Pharmacy is consulted to dose Vancomycin. Current antibiotics:   x1 3/13 PM  Cefazolin 1000mg IV q8h (3/14-current)  Vancomycin - Pharmacy to dose   1000mg IV x1 3/13 23:00   1250mg IV q12h (3/14-current)    Ht Readings from Last 1 Encounters:   03/13/22 5' 7\" (1.702 m)     Wt Readings from Last 1 Encounters:   03/13/22 140 lb 4.8 oz (63.6 kg)       Vanc Level(s) / Doses:  Date Time Dose Level / Type of Level Interpretation   n/a              Note: Serum levels collected for AUC-based dosing may be high if collected in close proximity to the dose administered. This is not necessarily an indicator of toxicity.     Recent Labs     03/13/22 1953 03/14/22  0550   CREATININE 0.5* <0.5*   BUN 8 7   WBC 13.8* 11.9*       CrCl cannot be calculated (This lab value cannot be used to calculate CrCl because it is not a number: <0.5).     Cultures & Sensitivities:  Date Site Micro Susceptibility / Result   3/13 Blood x2 sent    3/13 Wound sent

## 2022-03-14 NOTE — PROGRESS NOTES
4 Eyes Admission Assessment     I agree as the admission nurse that 2 RN's have performed a thorough Head to Toe Skin Assessment on the patient. ALL assessment sites listed below have been assessed on admission. Areas assessed by both nurses:   [x]   Head, Face, and Ears   [x]   Shoulders, Back, and Chest  [x]   Arms, Elbows, and Hands   [x]   Coccyx, Sacrum, and Ischium  [x]   Legs, Feet, and Heels        Does the Patient have Skin Breakdown?   Yes    Abrasion on right big toe  Abrasion on right and left ankles  Right heel abrasion  Left heel unstageable  Redness on both hips  Left knee and right knee have abrasions  Left and right shin have scattered abrasions  Bilateral hips redness   Abrasion above right eyebrow   tears on right scapula area        Edwin Prevention initiated:  Yes   Wound Care Orders initiated:  Yes      [id number]  nurse consulted for Pressure Injury (Stage 3,4, Unstageable, DTI, NWPT, and Complex wounds) or Edwin score 18 or lower:  yes    Nurse 1 eSignature: Electronically signed by Any Guzman RN on 3/14/22 at 12:56 AM EDT    **SHARE this note so that the co-signing nurse is able to place an eSignature**    Nurse 2 eSignature: Electronically signed by Michael Arteaga RN on 3/14/22 at 1:09 AM EDT

## 2022-03-14 NOTE — CONSULTS
Vascular surgery   Resident Consult Note    Reason for Consult: Lower extremity wounds    History of Present Illness:   Betsy Priest is a 67 y.o. male with Hx as delineated below who presented to [facility] with altered mental status. The patient was found to have wounds on his bilateral lower extremities for which vascular surgery was consulted for evaluation. He states the wounds have been present for several weeks. He experiences intermittent swelling. Endorses some pain in his legs with walking. He is unable to walk far without a walker given his previous debility. He complains of claudication. He denies any pain at night. He states he smokes around 1 pack of cigarettes per day and denies any previous vascular interventions to his legs in the past.    Past Medical History:        Diagnosis Date    Enlarged prostate     Heel ulcer ([facility] [address]     left    Subdural hemorrhage following injury ([facility] [address]     fall- went to [facility]       Past Surgical History:    History reviewed. No pertinent surgical history. Allergies:  Patient has no known allergies. Medications:   Home Meds  No current facility-administered medications on file prior to encounter. Current Outpatient Medications on File Prior to Encounter   Medication Sig Dispense Refill    clorazepate (TRANXENE) 7.5 MG tablet Take 7.5 mg by mouth nightly as needed for Sleep.  PARoxetine (PAXIL) 40 MG tablet Take 40 mg by mouth every morning      tamsulosin (FLOMAX) 0.4 MG capsule Take 0.8 mg by mouth daily      temazepam (RESTORIL) 15 MG capsule Take 30 mg by mouth nightly as needed for Sleep.       rosuvastatin (CRESTOR) 40 MG tablet Take 40 mg by mouth daily      aspirin 81 MG chewable tablet Take 81 mg by mouth daily      Multiple Vitamin (MULTIVITAMIN) TABS tablet Take 1 tablet by mouth daily 30 tablet 0    loperamide (IMODIUM) 2 MG capsule Take 2 mg by mouth 4 times daily as needed for Diarrhea Usually takes twice in the morning and once at bedtime         Current Meds  folic acid (FOLVITE) tablet 1 mg, Daily  multivitamin 1 tablet, Daily  thiamine mononitrate tablet 100 mg, Daily  sodium chloride flush 0.9 % injection 5-40 mL, 2 times per day  sodium chloride flush 0.9 % injection 5-40 mL, PRN  0.9 % sodium chloride infusion, PRN  enoxaparin (LOVENOX) injection 40 mg, Daily  ondansetron (ZOFRAN-ODT) disintegrating tablet 4 mg, Q8H PRN   Or  ondansetron (ZOFRAN) injection 4 mg, Q6H PRN  polyethylene glycol (GLYCOLAX) packet 17 g, Daily PRN  acetaminophen (TYLENOL) tablet 650 mg, Q6H PRN   Or  acetaminophen (TYLENOL) suppository 650 mg, Q6H PRN  labetalol (NORMODYNE;TRANDATE) injection 20 mg, Q4H PRN  0.9 % sodium chloride infusion, Continuous  atorvastatin (LIPITOR) tablet 40 mg, Nightly  aspirin chewable tablet 81 mg, Daily  PARoxetine (PAXIL) tablet 40 mg, Daily  ceFAZolin (ANCEF) 2000 mg in dextrose 5 % 50 mL IVPB, Q8H  vancomycin (VANCOCIN) 1,250 mg in dextrose 5 % 250 mL IVPB, Q12H  [START ON 3/15/2022] tamsulosin (FLOMAX) capsule 0.8 mg, Daily  loperamide (IMODIUM) capsule 2 mg, 4x Daily PRN  lidocaine 4 % external patch 1 patch, Daily  oxyCODONE (ROXICODONE) immediate release tablet 2.5 mg, Q6H PRN        Family History:   History reviewed. No pertinent family history. Social History:   TOBACCO:   reports that he has been smoking cigarettes. He has never used smokeless tobacco.  ETOH:   reports current alcohol use. DRUGS:   has no history on file for drug use. Review of Systems:   14 point review of systems was conducted and all pertinent positives and negatives were included in the HPI.     Physical exam:    Vitals:    03/14/22 0721 03/14/22 1008 03/14/22 1330 03/14/22 1535   BP: (!) 149/74 (!) 124/59 138/74 (!) 156/80   Pulse: 65 66 73 66   Resp: 16 20 16 16   Temp: 98 °F (36.7 °C) 97.7 °F (36.5 °C) 97.6 °F (36.4 °C) 98.2 °F (36.8 °C)   TempSrc: Axillary Axillary Oral Oral   SpO2: 92% 94% 97% 97%   Weight:       Height: General appearance: alert, no acute distress  Eyes: PERRL, no scleral icterus  Neck: trachea midline, no JVD  Chest/Lungs: symmetrical chest rise, normal effort, on room air  Cardiovascular: RRR  Abdomen: soft, non-tender, non-distended  Skin: warm and dry, no rashes  Extremities: mild bilateral lower extremity edema, no cyanosis, normal capillary refill, diffuse dermatologic changes of the bilateral lower extremities, left medial ankle with a full-thickness ulceration with erythema noted around the wound, left plantar heel wound, full-thickness ulceration at the right proximal lower extremity at the fibular head, full-thickness ulceration of the right second digit  Neuro: A&Ox3, no focal deficits, sensation intact    Pulses    F P PT DP   R  palpable  Doppler  monophasic  monophasic   L  palpable  Doppler  monophasic  monophasic    +, -/+ ,-/-     Labs:    CBC:   Recent Labs     03/13/22 1953 03/14/22  0550   WBC 13.8* 11.9*   HGB 11.9* 10.7*   HCT 35.4* 31.1*   MCV 97.1 96.0    305     BMP:   Recent Labs     03/13/22 1953 03/14/22  0550   * 139   K 4.5 4.0   CL 98* 104   CO2 23 26   BUN 8 7   CREATININE 0.5* <0.5*     PT/INR: No results for input(s): PROTIME, INR in the last 72 hours. APTT: No results for input(s): APTT in the last 72 hours.   Liver Profile:   Lab Results   Component Value Date    AST 36 03/13/2022    ALT 22 03/13/2022    BILIDIR <0.2 03/13/2022    BILITOT <0.2 03/13/2022    ALKPHOS 106 03/13/2022   No results found for: CHOL, HDL, TRIG  UA:   Lab Results   Component Value Date    COLORU Yellow 03/13/2022    PHUR 6.5 03/13/2022    WBCUA None seen 03/13/2022    RBCUA None seen 03/13/2022    BACTERIA 3+ 09/30/2021    CLARITYU Clear 03/13/2022    SPECGRAV 1.020 03/13/2022    LEUKOCYTESUR Negative 03/13/2022    UROBILINOGEN 0.2 03/13/2022    BILIRUBINUR Negative 03/13/2022    BLOODU Negative 03/13/2022    GLUCOSEU Negative 03/13/2022    AMORPHOUS Rare 03/13/2022       Imaging: XR FOOT RIGHT (MIN 3 VIEWS)   Final Result   Right foot:   1. Multifocal skin defects without radiographic evidence of osteomyelitis. Right ankle:   1. Skin defects and soft tissue swelling over the lateral malleolus without radiographic evidence of osteomyelitis. XR ANKLE LEFT (MIN 3 VIEWS)   Final Result   Skin defect on the medial ankle and heel without radiographic evidence of osteomyelitis. XR ANKLE RIGHT (MIN 3 VIEWS)   Final Result   Right foot:   1. Multifocal skin defects without radiographic evidence of osteomyelitis. Right ankle:   1. Skin defects and soft tissue swelling over the lateral malleolus without radiographic evidence of osteomyelitis. XR FOOT LEFT (MIN 3 VIEWS)   Final Result   None. Left foot:      FINDINGS: AP, lateral, and oblique views of the left foot were obtained. Alignment is anatomic. No fracture or dislocation. No bony erosion. Soft tissues are unremarkable. IMPRESSION:   1. Normal radiographs of the left foot. CHEST:      FINDINGS: Portable AP view of the chest was obtained. Lungs are clear. Cardiomediastinal contours are normal. No pleural effusion or pneumothorax. There are 2 remote right-sided rib fractures of the right sixth and seventh ribs. IMPRESSION:   1. No acute findings in the chest.      XR CHEST PORTABLE   Final Result   None. Left foot:      FINDINGS: AP, lateral, and oblique views of the left foot were obtained. Alignment is anatomic. No fracture or dislocation. No bony erosion. Soft tissues are unremarkable. IMPRESSION:   1. Normal radiographs of the left foot. CHEST:      FINDINGS: Portable AP view of the chest was obtained. Lungs are clear. Cardiomediastinal contours are normal. No pleural effusion or pneumothorax. There are 2 remote right-sided rib fractures of the right sixth and seventh ribs. IMPRESSION:   1. No acute findings in the chest.      CT HEAD WO CONTRAST   Final Result   1.  No acute intracranial findings. 2. Previous chronic appearing right frontal subdural hematoma has resolved. CT Angio Abd with BL Runoff 2/9/2022  IMPRESSION:   1. 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 2. Calcification and moderate to severe stenosis of the proximal right common iliac artery. 3. The arteries of the lower extremities are patent bilaterally and without flow-limiting stenosis. 4. A 3.9 cm infrarenal abdominal aortic aneurysm has slightly increased in size since 2019 by a few millimeters. 5. Incidental note of a few nonspecific mildly dilated fluid-filled loops of small bowel in the right lower quadrant without specific transition point. This could be related to a transient finding with peristalsis of fluid contents of small bowel. A low-grade partial small bowel obstruction cannot be entirely excluded. Clinical correlation with patient symptoms recommended. VL Duplex BL LE 2/1/2022  FINAL IMPRESSIONS   Conclusions: The ankle/brachial index in the bilateral lower extremities suggest moderate   arterial insufficiency at rest, consistent with intermittent claudication. Abnormal monophasic doppler spectra at the common femoral level suggest significant arterial   occlusive disease involving the inflow vessels of the bilateral lower extremities. No other evidence of significant arterial occlusive disease in the bilateral lower extremities. Incidental finding: Evaluation of the inflow vessels revealed a thrombosed abdominal aortic   aneurysm (measuring approximately 3.6 x 3.6 cm) and no flow in the mid abdominal aorta. The   distal aorta    and bifurcation were not visualized due to bowel gas interference. Assessment/Plan: This is a 67 y.o. male with past medical history stated above who presented to the [facility] with altered mental status.   Vascular surgery was consulted for multiple lower extremity wounds. On examination the patient has monophasic bilateral DP and PT pulses. Upon further chart review the patient was recently seen at [facility] with vascular surgery in February 2022 for a thrombosed AAA as well as peripheral arterial disease. The studies obtained are shown above. The plan was for the patient to receive an arteriogram for moderate to severe stenosis of the proximal right common iliac artery. Will discuss with the patient whether or not he would prefer intervention with [facility] or while he is here at the [facility]  No indication for acute intervention at this time. Vascular surgery will continue to follow.      Neo Juan DO  PGY-5 General Surgery  03/14/22  4:50 PM  [phone]

## 2022-03-14 NOTE — PROGRESS NOTES
Pt admitted to room 5309. Pt is alert and oriented. BP elevated. Pt has many scattered abrasions. Wounds and bruising. Pt has avendaño in place. Call light light within reach.  Will continue to monitor

## 2022-03-14 NOTE — CONSULTS
[address] [address] Continence Nurse  Consult Note       NAME:  Marilyn Guan  MEDICAL RECORD NUMBER:  [id number]  AGE: 67 y.o. GENDER: male  : 1950  TODAY'S DATE:  3/14/2022    Subjective   Reason for WOCN Evaluation and Assessment: L Heel - Unstageable  R 2nd Toe - Arterial   R & L Ankles Medial - Arterial  R Lower Leg Lateral - Traumatic      Marilyn Guan is a 67 y.o. male referred by:   [] Physician  [x] Nursing  [] Other:     Wound Identification:  Wound Type: arterial, pressure and traumatic  Contributing Factors: chronic pressure, decreased mobility, shear force and malnutrition    Wound History: Marilyn Guan is a 67 y.o. male with medical history of BPH, TBI, AAA and HLD who presents with altered mental status. Patient was recently admitted to [facility] on 2022 for a mechanical fall with subsequent subdural hematoma as a result. No neurosurgical intervention occurred. Patient was discharged on 2022 to SNF where he completed his rehabilitation and was then discharged home with 24 hour care. Patient's physical conditioning and mentation has been slowly improving. Today, patient's son got a call from his caretaker reporting around noon he became confused and was only oriented to himself. The caretaker was concerned that the patient was also developing an urinary tract infection as he had similar symptoms of suprapubic pain, increased urgency and inability to urinate. Patient's son also states that the patient has been started on Paxil for his anxiety and depression as well as Restoril PRN as a sleep aid. According to his caretaker, he has been taking all his prescribed medication.   Patient denies any dysuria, fever, chills, chest pain, shortness of breath, abdominal pain, constipation, diarrhea, recent trauma, falls, cough, or palpitations  Current Wound Care Treatment: R Lower Leg Lateral - Betadine, foam  L Heel, R 2nd Toe, R & L Medial Ankles - Podiatry consulted    Patient Goal of Care:  [x] Wound Healing  [] Odor Control  [] Palliative Care  [] Pain Control   [] Other:         PAST MEDICAL HISTORY        Diagnosis Date    Enlarged prostate     Heel ulcer ([facility] [address]     left    [facility]     [facility]- [facility] [address]    History reviewed. No pertinent surgical history. FAMILY HISTORY    History reviewed. No pertinent family history. SOCIAL HISTORY    Social History     Tobacco Use    Smoking status: Current Some Day Smoker     Types: Cigarettes    Smokeless tobacco: Never Used   Substance Use Topics    Alcohol use: Yes     Alcohol/week: 0.0 standard drinks     Comment: seldom    Drug use: Not on file       ALLERGIES    No Known Allergies    MEDICATIONS    No current facility-administered medications on file prior to encounter.      Current Outpatient Medications on File Prior to Encounter   Medication Sig Dispense Refill    acetaminophen (TYLENOL) 500 MG tablet Take 1 tablet by mouth 4 times daily as needed for Pain 28 tablet 1    tamsulosin (FLOMAX) 0.4 MG capsule Take 1 capsule by mouth daily 30 capsule 3    Multiple Vitamin (MULTIVITAMIN) TABS tablet Take 1 tablet by mouth daily 30 tablet 0    thiamine mononitrate (THIAMINE) 100 MG tablet Take 1 tablet by mouth daily 30 tablet 0    folic acid (FOLVITE) 1 MG tablet Take 1 tablet by mouth daily 90 tablet 1    loperamide (IMODIUM) 2 MG capsule Take 2 mg by mouth 4 times daily as needed for Diarrhea         Objective: Lying in bed, F/C, sleeping but easy to awaken    BP (!) 124/59   Pulse 66   Temp 97.7 °F (36.5 °C) (Axillary)   Resp 20   Ht 5' 7\" (1.702 m)   Wt 140 lb 4.8 oz (63.6 kg)   SpO2 94%   BMI 21.97 kg/m²     LABS:  WBC:    Lab Results   Component Value Date    WBC 11.9 03/14/2022     H/H:    Lab Results   Component Value Date    HGB 10.7 03/14/2022    HCT 31.1 03/14/2022     PTT:  No results found for: APTT, PTT[APTT}  PT/INR:    Lab Results   Component Value Date    PROTIME 13.7 09/29/2021    INR 1.20 09/29/2021     HgBA1c:  No results found for: LABA1C    Assessment: See LDA's   Edwin Risk Score: Edwin Scale Score: 16    Patient Active Problem List   Diagnosis Code    AMS (altered mental status) R41.82    Cellulitis and abscess of left lower extremity L03.116, L02.416       Measurements:  Wound 03/14/22 Heel Left (Active)   Wound Image   03/14/22 1012   Wound Etiology Pressure Unstageable 03/14/22 1012   Dressing Status Dry; Intact 03/14/22 1012   Wound Cleansed Cleansed with saline 03/14/22 1012   Offloading for Diabetic Foot Ulcers Offloading boot 03/14/22 1012   Wound Length (cm) 3.6 cm 03/14/22 1012   Wound Width (cm) 4 cm 03/14/22 1012   Wound Depth (cm) 0 cm 03/14/22 1012   Wound Surface Area (cm^2) 14.4 cm^2 03/14/22 1012   Wound Volume (cm^3) 0 cm^3 03/14/22 1012   Wound Assessment Dry;Pink/red 03/14/22 1012   Drainage Amount None 03/14/22 1012   Odor None 03/14/22 1012   Isis-wound Assessment Dry/flaky 03/14/22 1012   Margins Defined edges 03/14/22 1012   Number of days: 0    L Heel:         Wound 03/14/22 Toe (Comment  which one) Right 2nd Toe (Active)   Wound Image   03/14/22 1012   Wound Etiology Arterial 03/14/22 1012   Dressing Status New drainage noted 03/14/22 1012   Wound Cleansed Cleansed with saline 03/14/22 1012   Offloading for Diabetic Foot Ulcers Offloading boot 03/14/22 1012   Wound Length (cm) 0.8 cm 03/14/22 1012   Wound Width (cm) 0.7 cm 03/14/22 1012   Wound Depth (cm) 0.2 cm 03/14/22 1012   Wound Surface Area (cm^2) 0.56 cm^2 03/14/22 1012   Wound Volume (cm^3) 0.112 cm^3 03/14/22 1012   Undermining Starts ___ O'Clock 3 03/14/22 1012   Undermining Ends___ O'Clock 9 03/14/22 1012   Undermining Maxium Distance (cm) 0.3 03/14/22 1012   Wound Assessment Pink/red 03/14/22 1012   Drainage Amount Scant 03/14/22 1012   Drainage Description Purulent 03/14/22 1012   Odor None 03/14/22 1012 Isis-wound Assessment Blanchable erythema;Dry/flaky;Edematous 03/14/22 1012   Margins Defined edges 03/14/22 1012   Wound Thickness Description not for Pressure Injury Full thickness 03/14/22 1012   Number of days: 0    R 2nd Toe:         Wound 03/14/22 Ankle Right;Medial (Active)   Wound Image   03/14/22 1012   Wound Etiology Arterial 03/14/22 1012   Dressing Status Dry; Intact 03/14/22 1012   Wound Cleansed Cleansed with saline 03/14/22 1012   Offloading for Diabetic Foot Ulcers Offloading boot 03/14/22 1012   Wound Length (cm) 0.9 cm 03/14/22 1012   Wound Width (cm) 1.2 cm 03/14/22 1012   Wound Depth (cm) 0 cm 03/14/22 1012   Wound Surface Area (cm^2) 1.08 cm^2 03/14/22 1012   Wound Volume (cm^3) 0 cm^3 03/14/22 1012   Wound Assessment Eschar dry 03/14/22 1012   Drainage Amount None 03/14/22 1012   Odor None 03/14/22 1012   Isis-wound Assessment Dry/flaky 03/14/22 1012   Margins Attached edges; Defined edges 03/14/22 1012   Number of days: 0    R Medial Ankle:         Wound 03/14/22 Ankle Left;Medial (Active)   Wound Image   03/14/22 1012   Wound Etiology Arterial 03/14/22 1012   Dressing Status Dry; Intact 03/14/22 1012   Wound Cleansed Cleansed with saline 03/14/22 1012   Offloading for Diabetic Foot Ulcers Offloading boot 03/14/22 1012   Wound Length (cm) 0.6 cm 03/14/22 1012   Wound Width (cm) 0.7 cm 03/14/22 1012   Wound Depth (cm) 0.1 cm 03/14/22 1012   Wound Surface Area (cm^2) 0.42 cm^2 03/14/22 1012   Wound Volume (cm^3) 0.042 cm^3 03/14/22 1012   Wound Assessment Dry;Slough 03/14/22 1012   Drainage Amount None 03/14/22 1012   Odor None 03/14/22 1012   Isis-wound Assessment Blanchable erythema;Dry/flaky 03/14/22 1012   Margins Attached edges; Defined edges 03/14/22 1012   Number of days: 0    L Medial Ankle:         Wound 03/14/22 Leg Right; Lower; Lateral (Active)   Wound Image   03/14/22 1012   Wound Etiology Traumatic 03/14/22 1012   Dressing Status Dry; Intact 03/14/22 1012   Wound Cleansed Cleansed with saline 03/14/22 1012   Dressing/Treatment Betadine swabs/povidone iodine; Foam 03/14/22 1012   Dressing Change Due 03/15/22 03/14/22 1012   Wound Length (cm) 2.5 cm 03/14/22 1012   Wound Width (cm) 1.5 cm 03/14/22 1012   Wound Depth (cm) 0 cm 03/14/22 1012   Wound Surface Area (cm^2) 3.75 cm^2 03/14/22 1012   Wound Volume (cm^3) 0 cm^3 03/14/22 1012   Wound Assessment Eschar dry 03/14/22 1012   Drainage Amount None 03/14/22 1012   Odor None 03/14/22 1012   Isis-wound Assessment Blanchable erythema;Dry/flaky 03/14/22 1012   Margins Attached edges; Defined edges 03/14/22 1012   Number of days: 0   R Lower Leg Lateral:      Response to treatment:  Well tolerated by patient. Pain Assessment:  Severity:  0 / 10  Quality of pain: N/A  Wound Pain Timing/Severity: none  Premedicated: No    Plan   Plan of Care: Wound 03/14/22 Leg Right; Lower; Lateral-Dressing/Treatment: Betadine swabs/povidone iodine,Foam   Recommendation: R Lower Leg Lateral - clean with NS, dry, paint with betadine daily, cover with foam dressing  R 2nd Toe, L Heel, Bilateral Medial Ankles - defer to Podiatry treatment  Reposition pt every 2 hours  Call Wound Care for deterioration [phone]    Specialty Bed Required : No   [] Low Air Loss   [] Pressure Redistribution  [] Fluid Immersion  [] Bariatric  [] Total Pressure Relief  [] Other:     Current Diet: ADULT DIET;  Regular  Dietician consult:  No    Discharge Plan:  Placement for patient upon discharge: skilled nursing    Patient appropriate for Outpatient [address]: No    Referrals:  [x]   [] [address] [facility]  [] Supplies  [] Other    Patient/Caregiver Teaching:  Level of patient/caregiver understanding able to:   [] Indicates understanding       [] Needs reinforcement  [] Unsuccessful      [] Verbal Understanding  [] Demonstrated understanding       [] No evidence of learning  [] Refused teaching         [] N/A       Electronically signed by Kimmie Ghotra RN, Lory Miller on 3/14/2022 at 10:21 AM

## 2022-03-15 LAB
ANION GAP SERPL CALCULATED.3IONS-SCNC: 10 MMOL/L (ref 3–16)
BASOPHILS ABSOLUTE: 0 K/UL (ref 0–0.2)
BASOPHILS RELATIVE PERCENT: 0.3 %
BUN BLDV-MCNC: 9 MG/DL (ref 7–20)
CALCIUM SERPL-MCNC: 8.9 MG/DL (ref 8.3–10.6)
CHLORIDE BLD-SCNC: 103 MMOL/L (ref 99–110)
CO2: 25 MMOL/L (ref 21–32)
CREAT SERPL-MCNC: 0.5 MG/DL (ref 0.8–1.3)
EOSINOPHILS ABSOLUTE: 0.5 K/UL (ref 0–0.6)
EOSINOPHILS RELATIVE PERCENT: 4.5 %
GFR AFRICAN AMERICAN: >60
GFR NON-AFRICAN AMERICAN: >60
GLUCOSE BLD-MCNC: 101 MG/DL (ref 70–99)
HCT VFR BLD CALC: 30.9 % (ref 40.5–52.5)
HEMOGLOBIN: 10.4 G/DL (ref 13.5–17.5)
LYMPHOCYTES ABSOLUTE: 1.3 K/UL (ref 1–5.1)
LYMPHOCYTES RELATIVE PERCENT: 12 %
MCH RBC QN AUTO: 32.8 PG (ref 26–34)
MCHC RBC AUTO-ENTMCNC: 33.9 G/DL (ref 31–36)
MCV RBC AUTO: 96.9 FL (ref 80–100)
MONOCYTES ABSOLUTE: 0.9 K/UL (ref 0–1.3)
MONOCYTES RELATIVE PERCENT: 8.3 %
NEUTROPHILS ABSOLUTE: 8.1 K/UL (ref 1.7–7.7)
NEUTROPHILS RELATIVE PERCENT: 74.9 %
PDW BLD-RTO: 13 % (ref 12.4–15.4)
PLATELET # BLD: 305 K/UL (ref 135–450)
PMV BLD AUTO: 7 FL (ref 5–10.5)
POTASSIUM REFLEX MAGNESIUM: 4.1 MMOL/L (ref 3.5–5.1)
RBC # BLD: 3.18 M/UL (ref 4.2–5.9)
SODIUM BLD-SCNC: 138 MMOL/L (ref 136–145)
VANCOMYCIN RANDOM: 16.6 UG/ML
WBC # BLD: 10.8 K/UL (ref 4–11)

## 2022-03-15 PROCEDURE — 6370000000 HC RX 637 (ALT 250 FOR IP): Performed by: STUDENT IN AN ORGANIZED HEALTH CARE EDUCATION/TRAINING PROGRAM

## 2022-03-15 PROCEDURE — 85025 COMPLETE CBC W/AUTO DIFF WBC: CPT

## 2022-03-15 PROCEDURE — 6370000000 HC RX 637 (ALT 250 FOR IP)

## 2022-03-15 PROCEDURE — 2580000003 HC RX 258

## 2022-03-15 PROCEDURE — 6360000002 HC RX W HCPCS

## 2022-03-15 PROCEDURE — 51798 US URINE CAPACITY MEASURE: CPT

## 2022-03-15 PROCEDURE — 80048 BASIC METABOLIC PNL TOTAL CA: CPT

## 2022-03-15 PROCEDURE — 80202 ASSAY OF VANCOMYCIN: CPT

## 2022-03-15 PROCEDURE — 2500000003 HC RX 250 WO HCPCS: Performed by: STUDENT IN AN ORGANIZED HEALTH CARE EDUCATION/TRAINING PROGRAM

## 2022-03-15 PROCEDURE — 2580000003 HC RX 258: Performed by: STUDENT IN AN ORGANIZED HEALTH CARE EDUCATION/TRAINING PROGRAM

## 2022-03-15 PROCEDURE — 6360000002 HC RX W HCPCS: Performed by: STUDENT IN AN ORGANIZED HEALTH CARE EDUCATION/TRAINING PROGRAM

## 2022-03-15 PROCEDURE — 1200000000 HC SEMI PRIVATE

## 2022-03-15 PROCEDURE — 36415 COLL VENOUS BLD VENIPUNCTURE: CPT

## 2022-03-15 RX ORDER — TAMSULOSIN HYDROCHLORIDE 0.4 MG/1
0.8 CAPSULE ORAL DAILY
Qty: 30 CAPSULE | Refills: 3 | Status: CANCELLED | OUTPATIENT
Start: 2022-03-16

## 2022-03-15 RX ORDER — MECOBALAMIN 5000 MCG
5 TABLET,DISINTEGRATING ORAL NIGHTLY
Status: COMPLETED | OUTPATIENT
Start: 2022-03-16 | End: 2022-03-16

## 2022-03-15 RX ORDER — HYDRALAZINE HYDROCHLORIDE 20 MG/ML
10 INJECTION INTRAMUSCULAR; INTRAVENOUS ONCE
Status: COMPLETED | OUTPATIENT
Start: 2022-03-15 | End: 2022-03-15

## 2022-03-15 RX ORDER — ERGOCALCIFEROL 1.25 MG/1
50000 CAPSULE ORAL WEEKLY
Status: DISCONTINUED | OUTPATIENT
Start: 2022-03-15 | End: 2022-03-16 | Stop reason: HOSPADM

## 2022-03-15 RX ADMIN — CEFAZOLIN SODIUM 2000 MG: 10 INJECTION, POWDER, FOR SOLUTION INTRAVENOUS at 05:27

## 2022-03-15 RX ADMIN — FOLIC ACID 1 MG: 1 TABLET ORAL at 08:16

## 2022-03-15 RX ADMIN — VANCOMYCIN HYDROCHLORIDE 1250 MG: 10 INJECTION, POWDER, LYOPHILIZED, FOR SOLUTION INTRAVENOUS at 01:14

## 2022-03-15 RX ADMIN — TAMSULOSIN HYDROCHLORIDE 0.8 MG: 0.4 CAPSULE ORAL at 08:17

## 2022-03-15 RX ADMIN — ERGOCALCIFEROL 50000 UNITS: 1.25 CAPSULE ORAL at 10:51

## 2022-03-15 RX ADMIN — ENOXAPARIN SODIUM 40 MG: 100 INJECTION SUBCUTANEOUS at 08:17

## 2022-03-15 RX ADMIN — VANCOMYCIN HYDROCHLORIDE 1250 MG: 10 INJECTION, POWDER, LYOPHILIZED, FOR SOLUTION INTRAVENOUS at 14:16

## 2022-03-15 RX ADMIN — ACETAMINOPHEN 650 MG: 325 TABLET ORAL at 21:38

## 2022-03-15 RX ADMIN — CEFAZOLIN SODIUM 2000 MG: 10 INJECTION, POWDER, FOR SOLUTION INTRAVENOUS at 13:27

## 2022-03-15 RX ADMIN — CEFAZOLIN SODIUM 2000 MG: 10 INJECTION, POWDER, FOR SOLUTION INTRAVENOUS at 21:31

## 2022-03-15 RX ADMIN — HYDRALAZINE HYDROCHLORIDE 10 MG: 20 INJECTION INTRAMUSCULAR; INTRAVENOUS at 17:58

## 2022-03-15 RX ADMIN — LOPERAMIDE HYDROCHLORIDE 2 MG: 2 CAPSULE ORAL at 21:26

## 2022-03-15 RX ADMIN — LOPERAMIDE HYDROCHLORIDE 2 MG: 2 CAPSULE ORAL at 08:16

## 2022-03-15 RX ADMIN — ATORVASTATIN CALCIUM 40 MG: 40 TABLET, FILM COATED ORAL at 21:26

## 2022-03-15 RX ADMIN — SODIUM CHLORIDE, PRESERVATIVE FREE 10 ML: 5 INJECTION INTRAVENOUS at 08:18

## 2022-03-15 RX ADMIN — SODIUM CHLORIDE: 9 INJECTION, SOLUTION INTRAVENOUS at 13:27

## 2022-03-15 RX ADMIN — ASPIRIN 81 MG: 81 TABLET, CHEWABLE ORAL at 08:17

## 2022-03-15 RX ADMIN — PAROXETINE HYDROCHLORIDE 40 MG: 20 TABLET, FILM COATED ORAL at 08:17

## 2022-03-15 RX ADMIN — SODIUM CHLORIDE: 9 INJECTION, SOLUTION INTRAVENOUS at 03:22

## 2022-03-15 RX ADMIN — THIAMINE HCL TAB 100 MG 100 MG: 100 TAB at 08:19

## 2022-03-15 RX ADMIN — LORAZEPAM 0.5 MG: 0.5 TABLET ORAL at 21:26

## 2022-03-15 RX ADMIN — OXYCODONE HYDROCHLORIDE 2.5 MG: 5 TABLET ORAL at 15:58

## 2022-03-15 RX ADMIN — THERA TABS 1 TABLET: TAB at 08:17

## 2022-03-15 ASSESSMENT — PAIN SCALES - GENERAL
PAINLEVEL_OUTOF10: 0
PAINLEVEL_OUTOF10: 0
PAINLEVEL_OUTOF10: 8
PAINLEVEL_OUTOF10: 3
PAINLEVEL_OUTOF10: 0

## 2022-03-15 ASSESSMENT — PAIN DESCRIPTION - LOCATION: LOCATION: PENIS

## 2022-03-15 ASSESSMENT — PAIN DESCRIPTION - ORIENTATION: ORIENTATION: LOWER

## 2022-03-15 ASSESSMENT — PAIN DESCRIPTION - DESCRIPTORS: DESCRIPTORS: ACHING

## 2022-03-15 ASSESSMENT — PAIN DESCRIPTION - PAIN TYPE: TYPE: ACUTE PAIN

## 2022-03-15 ASSESSMENT — PAIN DESCRIPTION - FREQUENCY: FREQUENCY: INTERMITTENT

## 2022-03-15 ASSESSMENT — PAIN DESCRIPTION - ONSET: ONSET: SUDDEN

## 2022-03-15 NOTE — PROGRESS NOTES
Lowe removed by student nurse and instructor. Urinal at bedside. Incontinence pad on.  Will cont to monitor

## 2022-03-15 NOTE — CARE COORDINATION
Cm following, pt surgery following for vascular planning. Pod following in pt but  No surgical plans pt to be NWB on Left LE. Spoke with pt son Dheeraj Matute and he would like ARU referral to [facility] and [facility] both reviewed and unable to accept. Pt denied SNF and ARU stated \" I have help at home and therapy will come to my house\"   Spoke again with son, who understands and will set up 24 hour care again.   Electronically signed by Lesley Vigil RN on 3/15/2022 at 1:09 PM   [phone]

## 2022-03-15 NOTE — PROGRESS NOTES
Patient unable to void. 597 on bladder scan, orders to place avendaño. Patient tolerated well, urine return.  Will cont to monitor

## 2022-03-15 NOTE — DISCHARGE SUMMARY
INTERNAL MEDICINE DEPARTMENT AT [address] [place] [address]  DISCHARGE SUMMARY    Patient ID: Nba Dowling   Discharge Date: 3/16/2022    Patient's PCP: Dali Quispe MD                                          Discharge Physician: Josefina Richardson MD   Admit Date: 3/13/2022   Admitting Physician: Rodney Garcia DO    PROBLEMS DURING HOSPITALIZATION:  Present on Admission:   Cellulitis and abscess of left lower extremity      DISCHARGE DIAGNOSES:  1. Left Lower Extremity Cellulitis  2. PAD  3. Acute Encephalopathy 2/2 Acute Urinary Retention, Resolved   4. Acute Urinary Retention, Resolved     HPI:  Gurpreet Roblero a 67 y. o. male with medical history of BPH, TBI, AAA and HLD who presents with altered mental status. Patient was recently at [facility] between 2/14/2022-2/19/2022 for a mechanical fall with subsequent subdural hematoma as a result, there was no acute neurosurgical intervention which took place. The day of admission, patient's son received a call from facility that he was altered. Per caretaker at facility, patient was having suprapubic pain and inability to urinate. On arrival to [place] [address] ED, patient was hypertensive to 717'A systolic, other VSS. Bladder scan noted 1L of retained urine which was removed via placement of Lowe catheter. He was admitted to the floor for further medical management. He was noted to have LLE cellulitis which was treated with Vancomycin and Cefazolin in light of labs showing elevated white count and inflammatory markers. Vascular surgery was also consulted in light of findings of PAD and recent incomplete workup including R iliac arteriogram which was pending. Vascular surgery recommended the patient follow outpatient for completion of this workup. Mr. Brynn Constantino mental status resolved to baseline throughout the course of his admission. A voiding trial was conducted 3/15/2022 however Mr. Raj Nieves was unable to urinate independently.  Urology was consulted and recommended keeping Lowe in on discharge with outpatient appointment with patient's Urologist, Dr. Jennie Chu, to be scheduled. On the day of discharge, patient was AOx4 and at his mental and physical baseline. He endorsed feeling well compared to admission and had no acute complaints. He was recommended acute rehabilitation facility based upon physical and occupational therapy scores, however refused. He was counseled on the risks of refusing therapy and endorsed understanding. He was discharged home with 24-hour health care. His son who is the primary contact was updated and educated regarding all elements of the hospital course as well as discharged conditions. Mr. Antonino Martin was discharged home in stable condition with detailed instructions regarding new medications, Lowe care, wound care and follow-up appointments. Physical Exam:  BP (!) 150/73   Pulse 58   Temp 98 °F (36.7 °C) (Axillary)   Resp 16   Ht 5' 7\" (1.702 m)   Wt 140 lb 4.8 oz (63.6 kg)   SpO2 94%   BMI 21.97 kg/m²      Physical Exam  Constitutional:       Appearance: Normal appearance. HENT:      Head: Normocephalic and atraumatic. Nose: Nose normal.      Mouth/Throat:      Mouth: Mucous membranes are moist.      Pharynx: Oropharynx is clear. Eyes:      Extraocular Movements: Extraocular movements intact. Conjunctiva/sclera: Conjunctivae normal.   Cardiovascular:      Rate and Rhythm: Normal rate and regular rhythm. Pulses: Normal pulses. Heart sounds: Normal heart sounds. Pulmonary:      Effort: Pulmonary effort is normal.      Breath sounds: Normal breath sounds. Abdominal:      General: Bowel sounds are normal.      Palpations: Abdomen is soft. Musculoskeletal:         General: Normal range of motion. Cervical back: Normal range of motion and neck supple. Skin:     Comments: L heel with mild erythema, warmth, tenderness. Neurological:      General: No focal deficit present.       Mental Status: He is alert and oriented to person, place, and time. Mental status is at baseline. Consults: Vascular Surgery, Podiatry, Urology   Significant Diagnostic Studies: Xray Lower Extremities   Treatments: Antibiotics (Vancomycin, Ancef), Lowe   Disposition: Home with Katelin Martinez  Discharged Condition: Stable   Follow Up: Primary Care Physician in 1 Week, Vascular Surgery, Urology    DISCHARGE MEDICATION:     Medication List      START taking these medications    cephALEXin 500 MG capsule  Commonly known as: KEFLEX  Take 1 capsule by mouth 4 times daily for 7 days     vitamin D 1.25 MG (19102 UT) Caps capsule  Commonly known as: ERGOCALCIFEROL  Take 1 capsule by mouth once a week  Start taking on: March 22, 2022        Evlyn Child taking these medications    aspirin 81 MG chewable tablet     clorazepate 7.5 MG tablet  Commonly known as: TRANXENE     loperamide 2 MG capsule  Commonly known as: IMODIUM     multivitamin Tabs tablet  Take 1 tablet by mouth daily     PARoxetine 40 MG tablet  Commonly known as: PAXIL     rosuvastatin 40 MG tablet  Commonly known as: CRESTOR     tamsulosin 0.4 MG capsule  Commonly known as: FLOMAX     temazepam 15 MG capsule  Commonly known as: RESTORIL           Where to Get Your Medications      These medications were sent to [address] [place] [address]    Phone: [phone]   · cephALEXin 500 MG capsule  · vitamin D 1.25 MG (21438 UT) Caps capsule       Activity: As tolerated. Diet: As tolerated. Wound Care: Per Podiatry. Time Spent on discharge is more than 15 minutes.     Signed:  Angelia Brown MD, PGY-1  Internal Medicine Resident  3/16/2022

## 2022-03-15 NOTE — PROGRESS NOTES
Avendaño catheter removed by writer, 10 cc of water removed from balloon. Patient educated on avendaño removal and the timeline of six hours to urinate post-removal. Patient tolerated removal, urinal left at bedside, instructed to call if staff is needed for assistance.

## 2022-03-15 NOTE — PROGRESS NOTES
VSS, A&O, used call light for needs. Pt tolerating diet, IVF infusing, IV abx infused. Fall precaution in place. Pt denies pain, avendaño intact with adequate output - see flow chart. Pt slept through the night. Bed locked, at lowest position, bed alarm on, pt wearing non-skid socks. Uneventful night with pt.

## 2022-03-15 NOTE — PROGRESS NOTES
Clinical Pharmacy Progress Note    Vancomycin - Management by Pharmacy    Consult Date(s):  3/14/22  Consulting Provider(s):  Dr. Katherine Adair / Plan  1)  B/L LE chronic ulcers with cellulitis - Vancomycin   Concurrent Antimicrobials: Cefazolin    Day of Vanc Therapy: 2   Current Dosing Method: Bayesian-guided AUC  o Therapeutic Goal: -500 mg/L*hr  o Currently on 1250mg IV q12h. Random level drawn this AM = 16.6 - calculated AUC = 497 with steady state trough = 14.7. Continue same regimen.  o Will monitor for plan re: length of treatment. If short course of Vancomycin is planned and renal function remains stable, repeat level may not be needed. Will order repeat level if it becomes appropriate.  Will continue to monitor clinical condition and make adjustments to regimen as appropriate. Please call with questions--  Thanks--  Juan Pablo Maurer, PharmD, BCPS, BCGP  [id number] ([facility])   3/15/2022 8:41 AM      Interval update:  Vascular surgery consulted given multiple LE wounds. Subjective/Objective:   Dianne Ward is a 67 y.o. y/o male with a PMHx that includes BPH, subdural hemorrhage s/p fall, AAA, depression, anxiety, and HLD who is admitted with altered mental status - being treated for B/L LE chronic ulcers with cellulitis and urinary retention. Pharmacy is consulted to dose Vancomycin.     Current antibiotics:   x1 3/13 PM  Cefazolin 2000mg IV q8h (3/14-current)  Vancomycin - Pharmacy to dose   1000mg IV x1 3/13 23:00   1250mg IV q12h (3/14-current)    Ht Readings from Last 1 Encounters:   03/13/22 5' 7\" (1.702 m)     Wt Readings from Last 1 Encounters:   03/13/22 140 lb 4.8 oz (63.6 kg)       Vanc Level(s) / Doses:  Date Time Dose Level / Type of Level Interpretation   3/15 05:50 1250mg q12h Random = 16.6 mcg/mL · Drawn 3 hr after dose infused  · Calculated AUC = 497 with trough = 14.7          Note: Serum levels collected for AUC-based dosing may be high if collected in close proximity to the dose administered. This is not necessarily an indicator of toxicity. Recent Labs     03/13/22  1953 03/14/22  0550 03/15/22  0550   CREATININE 0.5* <0.5* 0.5*   BUN 8 7 9   WBC 13.8* 11.9* 10.8       Estimated Creatinine Clearance: 118 mL/min (A) (based on SCr of 0.5 mg/dL (L)).     Cultures & Sensitivities:  Date Site Micro Susceptibility / Result   3/13 Blood x2 No growth to date    3/13 Wound Not sent as no active drainage per podiatry

## 2022-03-15 NOTE — PROGRESS NOTES
fluctuance or drainage. Labs: Leukocytosis (13.8) with left shift, lactic acidosis (2.2), elevated ESR (52) & CRP (27.3)  Imaging: CT head, CXR & LLE XR (-). Bedside ultrasound (-) for abscess. Intervention: Ceftriaxone & vancomycin x1    Medications:     Scheduled Meds:   vitamin D  50,000 Units Oral Weekly    folic acid  1 mg Oral Daily    multivitamin  1 tablet Oral Daily    vitamin B-1  100 mg Oral Daily    sodium chloride flush  5-40 mL IntraVENous 2 times per day    enoxaparin  40 mg SubCUTAneous Daily    atorvastatin  40 mg Oral Nightly    aspirin  81 mg Oral Daily    PARoxetine  40 mg Oral Daily    ceFAZolin  2,000 mg IntraVENous Q8H    vancomycin  1,250 mg IntraVENous Q12H    tamsulosin  0.8 mg Oral Daily    lidocaine  1 patch TransDERmal Daily     Continuous Infusions:   sodium chloride      sodium chloride 100 mL/hr at 03/15/22 0322     PRN Meds:sodium chloride flush, sodium chloride, ondansetron **OR** ondansetron, polyethylene glycol, acetaminophen **OR** acetaminophen, labetalol, loperamide, oxyCODONE, LORazepam    Objective:   Vitals:   T-max:  Patient Vitals for the past 8 hrs:   BP Temp Temp src Pulse Resp SpO2   03/15/22 0715 139/71 99.3 °F (37.4 °C) Axillary 63 16 90 %   03/15/22 0325 134/65 98.4 °F (36.9 °C) Axillary 60 14 94 %       Intake/Output Summary (Last 24 hours) at 3/15/2022 0911  Last data filed at 3/15/2022 0714  Gross per 24 hour   Intake 480 ml   Output 3550 ml   Net -3070 ml       Review of Systems  Per Interval hx. Physical Exam  Constitutional:       Appearance: Normal appearance. HENT:      Head: Normocephalic and atraumatic. Nose: Nose normal.      Mouth/Throat:      Mouth: Mucous membranes are dry. Pharynx: Oropharynx is clear. Eyes:      Extraocular Movements: Extraocular movements intact. Conjunctiva/sclera: Conjunctivae normal.   Cardiovascular:      Rate and Rhythm: Normal rate and regular rhythm. Pulses: Normal pulses. Heart sounds: Normal heart sounds. Pulmonary:      Effort: Pulmonary effort is normal.      Breath sounds: Normal breath sounds. Abdominal:      General: Bowel sounds are normal.      Palpations: Abdomen is soft. Musculoskeletal:         General: Normal range of motion. Cervical back: Normal range of motion and neck supple. Skin:     Comments: L heel with mild erythema, warmth, tenderness. Neurological:      General: No focal deficit present. Mental Status: He is alert and oriented to person, place, and time. Mental status is at baseline. LABS:    CBC:   Recent Labs     03/13/22 1953 03/14/22  0550 03/15/22  0550   WBC 13.8* 11.9* 10.8   HGB 11.9* 10.7* 10.4*   HCT 35.4* 31.1* 30.9*    305 305   MCV 97.1 96.0 96.9     Renal:    Recent Labs     03/13/22 1953 03/14/22  0550 03/15/22  0550   * 139 138   K 4.5 4.0 4.1   CL 98* 104 103   CO2 23 26 25   BUN 8 7 9   CREATININE 0.5* <0.5* 0.5*   GLUCOSE 93 94 101*   CALCIUM 9.2 9.2 8.9   ANIONGAP 13 9 10     Hepatic:   Recent Labs     03/13/22 1953   AST 36   ALT 22   BILITOT <0.2   BILIDIR <0.2   PROT 7.3   LABALBU 3.8   ALKPHOS 106     Troponin: No results for input(s): TROPONINI in the last 72 hours. BNP: No results for input(s): BNP in the last 72 hours. Lipids: No results for input(s): CHOL, HDL in the last 72 hours. Invalid input(s): LDLCALCU, TRIGLYCERIDE  ABGs:  No results for input(s): PHART, VWE4QQL, PO2ART, FIK0TPA, BEART, THGBART, Y5JEWLSK, BKJ1DST in the last 72 hours. INR: No results for input(s): INR in the last 72 hours. Lactate: No results for input(s): LACTATE in the last 72 hours. Cultures:  -----------------------------------------------------------------  RAD:   XR FOOT RIGHT (MIN 3 VIEWS)   Final Result   Right foot:   1. Multifocal skin defects without radiographic evidence of osteomyelitis. Right ankle:   1.   Skin defects and soft tissue swelling over the lateral malleolus without radiographic evidence of osteomyelitis. XR ANKLE LEFT (MIN 3 VIEWS)   Final Result   Skin defect on the medial ankle and heel without radiographic evidence of osteomyelitis. XR ANKLE RIGHT (MIN 3 VIEWS)   Final Result   Right foot:   1. Multifocal skin defects without radiographic evidence of osteomyelitis. Right ankle:   1. Skin defects and soft tissue swelling over the lateral malleolus without radiographic evidence of osteomyelitis. XR FOOT LEFT (MIN 3 VIEWS)   Final Result   None. Left foot:      FINDINGS: AP, lateral, and oblique views of the left foot were obtained. Alignment is anatomic. No fracture or dislocation. No bony erosion. Soft tissues are unremarkable. IMPRESSION:   1. Normal radiographs of the left foot. CHEST:      FINDINGS: Portable AP view of the chest was obtained. Lungs are clear. Cardiomediastinal contours are normal. No pleural effusion or pneumothorax. There are 2 remote right-sided rib fractures of the right sixth and seventh ribs. IMPRESSION:   1. No acute findings in the chest.      XR CHEST PORTABLE   Final Result   None. Left foot:      FINDINGS: AP, lateral, and oblique views of the left foot were obtained. Alignment is anatomic. No fracture or dislocation. No bony erosion. Soft tissues are unremarkable. IMPRESSION:   1. Normal radiographs of the left foot. CHEST:      FINDINGS: Portable AP view of the chest was obtained. Lungs are clear. Cardiomediastinal contours are normal. No pleural effusion or pneumothorax. There are 2 remote right-sided rib fractures of the right sixth and seventh ribs. IMPRESSION:   1. No acute findings in the chest.      CT HEAD WO CONTRAST   Final Result   1. No acute intracranial findings. 2. Previous chronic appearing right frontal subdural hematoma has resolved.           Assessment/Plan:     Michelle Queen is a 67 y.o. male who presents with acute metabolic encephalopathy in setting of left lower extremity cellulitis.     Left Lower Extremity Wounds   Mild erythema and tenderness. No appreciable temperature difference, swelling. No fluctuance, drainage; XR L foot clear, US - for abscess formation. Afebrile on arrival; WBC 13.8 (left shift), CRP 27.3, ESR 52. S/p Vanc, CTX in ED.  - continue Vanc, Ancef  - Podiatry following peripherally - daily dressing changes, local wound care on Mon, Fri   - f/u blood, wound cultures    PAD  - Vascular c/s - attempting to get previous imaging from [facility] admission Feb 2022  - await Vascular reccs for further interventions      Acute Metabolic Encephalopathy 2/2 LLE Cellulitis   - treat as above  - f/u TSH, B12, Vit D     Physical Deconditioning   - PT/OT    Vitamin D Deficiency  Vit D 13 this admission.   - supplementing, 50k once weekly      Lactic Acidosis - Resolved  Elevated to 2.2 on arrival. S/p 1L LR.     Chronic Issues:  Mixed Anxiety & Depressive Disorder - continue Paxil, holding home Restoril   AAA - AAA measuring 3.9 x 3.6 cm on CT Feb 2022 compared to 3.7 x 3.5 cm in 2019; continue ASA, Lipitor        Code Status: Limited x4  FEN: Adult Diet; General   PPX: Lovenox  DISPO: [facility]    Alecia Rowland MD, PGY-1  03/15/22  9:11 AM    This patient has been staffed and discussed with Dr. Noni Brenner MD.

## 2022-03-15 NOTE — PROGRESS NOTES
Surgery Daily Progress Note  Cresencio Diaz  CC:  PAD    Subjective : No complaints this am.       Objective    Infusions:   sodium chloride      sodium chloride 100 mL/hr at 03/15/22 0322        I/O:I/O last 3 completed shifts: In: 480 [P.O.:480]  Out: 4600 [Urine:4600]           Wt Readings from Last 1 Encounters:   03/13/22 140 lb 4.8 oz (63.6 kg)                 LABS:    Recent Labs     03/14/22  0550 03/15/22  0550   WBC 11.9* 10.8   HGB 10.7* 10.4*   HCT 31.1* 30.9*   MCV 96.0 96.9    305        Recent Labs     03/13/22 1953 03/14/22  0550   * 139   K 4.5 4.0   CL 98* 104   CO2 23 26   BUN 8 7   CREATININE 0.5* <0.5*        Recent Labs     03/13/22 1953   AST 36   ALT 22   BILIDIR <0.2   BILITOT <0.2   ALKPHOS 106        Recent Labs     03/13/22 1953   LIPASE 20.0        Recent Labs     03/13/22 1953   PROT 7.3      No results for input(s): CKTOTAL, CKMB, CKMBINDEX, TROPONINI in the last 72 hours.      Exam:/65   Pulse 60   Temp 98.4 °F (36.9 °C) (Axillary)   Resp 14   Ht 5' 7\" (1.702 m)   Wt 140 lb 4.8 oz (63.6 kg)   SpO2 94%   BMI 21.97 kg/m²   General appearance: alert, no acute distress  Eyes: PERRL, no scleral icterus  Neck: trachea midline, no JVD  Chest/Lungs: symmetrical chest rise, normal effort, on room air  Cardiovascular: RRR  Abdomen: soft, non-tender, non-distended  Skin: warm and dry, no rashes  Extremities: mild bilateral lower extremity edema, no cyanosis, normal capillary refill, diffuse dermatologic changes of the bilateral lower extremities, left medial ankle with a full-thickness ulceration with erythema noted around the wound, left plantar heel wound, full-thickness ulceration at the right proximal lower extremity at the fibular head, full-thickness ulceration of the right second digit  Neuro: A&Ox3, no focal deficits, sensation intact     Pulses     F P PT DP   R  palpable  Doppler  monophasic  monophasic   L  palpable  Doppler  monophasic  monophasic +, -/+ ,-/-       ASSESSMENT/PLAN: Pt. is a 67 y.o. male admitted for AMS. Found to have BLE wounds. Upon chart inspection, pt has been seen for PAD at [facility] with plans for arteriogram of R common iliac.    - will discuss with staff wether or not intervention will be offered while in-pt. - Attempting to get CTA results from [facility] pushed to PACS to review prior to vascular planning    Georgiana Glynn  3/15/2022   6:28 AM  perfectserve    Addendum:  Discussed with staff. Imaging reviewed. Okay to follow up with vascular surgeon at [facility]. Please call back with questions.      Jenise Chu DO  PGY-5 General Surgery  03/15/22  2:06 PM  [phone]

## 2022-03-15 NOTE — PROGRESS NOTES
Podiatric Surgery Daily Progress Note      Admit Date: 3/13/2022      Code:Limited    Patient seen and examined, labs and records reviewed    Subjective:     Patient seen at bedside this am.  Patient denies any overnight acute events. Patient denies f/c/n/v/sob/cp. Review of Systems: A 12 point review of symptoms is unremarkable with the exception of the chief complaint. Patient specifically denies nausea, fever, vomiting, chills, shortness of breath, chest pain, abdominal pain, constipation or difficulty urinating.            Objective     /71   Pulse 63   Temp 99.3 °F (37.4 °C) (Axillary)   Resp 16   Ht 5' 7\" (1.702 m)   Wt 140 lb 4.8 oz (63.6 kg)   SpO2 90%   BMI 21.97 kg/m²      I/O:    Intake/Output Summary (Last 24 hours) at 3/15/2022 0812  Last data filed at 3/15/2022 0714  Gross per 24 hour   Intake 480 ml   Output 3550 ml   Net -3070 ml              Wt Readings from Last 3 Encounters:   03/13/22 140 lb 4.8 oz (63.6 kg)   02/28/22 135 lb (61.2 kg)   09/30/21 132 lb (59.9 kg)       LABS:    Recent Labs     03/14/22  0550 03/15/22  0550   WBC 11.9* 10.8   HGB 10.7* 10.4*   HCT 31.1* 30.9*    305        Recent Labs     03/15/22  0550      K 4.1      CO2 25   BUN 9   CREATININE 0.5*        Recent Labs     03/13/22  1953   PROT 7.3       CBC with Differential:    Lab Results   Component Value Date    WBC 10.8 03/15/2022    RBC 3.18 03/15/2022    HGB 10.4 03/15/2022    HCT 30.9 03/15/2022     03/15/2022    MCV 96.9 03/15/2022    MCH 32.8 03/15/2022    MCHC 33.9 03/15/2022    RDW 13.0 03/15/2022    LYMPHOPCT 12.0 03/15/2022    MONOPCT 8.3 03/15/2022    BASOPCT 0.3 03/15/2022    MONOSABS 0.9 03/15/2022    LYMPHSABS 1.3 03/15/2022    EOSABS 0.5 03/15/2022    BASOSABS 0.0 03/15/2022     CMP:    Lab Results   Component Value Date     03/15/2022    K 4.1 03/15/2022     03/15/2022    CO2 25 03/15/2022    BUN 9 03/15/2022    CREATININE 0.5 03/15/2022    GFRAA >60 head. Mild periwound erythema noted to the wound - improved. The wound does not probe to bone, no purulent drainage, tracking, tunneling, crepitus, or fluctuance noted.     Full-thickness ulceration measuring approximately 1 cm in diameter with a fibrotic, hyperkeratotic, and granular tissue base noted to the right second digit. Erythema noted to the second digit from the metatarsal phalangeal joint extending distally - significantly improved. The wound does not probe to bone, no apparent drainage, tracking, tunneling, crepitus, fluctuance noted.     MUSCULOSKELETAL: Muscle strength is 4/5 for all pedal groups tested. No pain with palpation of the foot or ankle b/l. Ankle joint ROM is decreased in dorsiflexion with the knee extended. No obvious biomechanical abnormalities. IMAGING:  XR left foot  Left foot:       FINDINGS: AP, lateral, and oblique views of the left foot were obtained. Alignment is anatomic. No fracture or dislocation. No bony erosion. Soft tissues are unremarkable.       IMPRESSION:   1. Normal radiographs of the left foot. XR right foot  Right foot:   There is a skin defect on the dorsal aspect of the mid second toe and the lateral aspect of the fifth metatarsal base. No discrete osseous abnormality is identified. There is no acute fracture or osseous destruction. XR right ankle  Right ankle: There is a skin defect superficial to the lateral malleolus. No osseous destruction is identified. There is mild diffuse soft tissue swelling. XR left ankle  Skin defect on the medial ankle and heel without radiographic evidence of osteomyelitis. VL Duplex [facility] 2/1/2022  FINAL IMPRESSIONS   Conclusions: The ankle/brachial index in the bilateral lower extremities suggest moderate   arterial insufficiency at rest, consistent with intermittent claudication.    Abnormal monophasic doppler spectra at the common femoral level suggest significant arterial   occlusive disease involving the inflow vessels of the bilateral lower extremities. No other evidence of significant arterial occlusive disease in the bilateral lower extremities. Incidental finding: Evaluation of the inflow vessels revealed a thrombosed abdominal aortic   aneurysm (measuring approximately 3.6 x 3.6 cm) and no flow in the mid abdominal aorta. The   distal aorta    and bifurcation were not visualized due to bowel gas interference. ASSESSMENT/PLAN  1. Full-thickness ulceration, right second digit (POA)  2.  Full-thickness ulceration, right proximal fibula (POA)  3.  Full-thickness ulceration, left medial malleolus (POA)  4. Pressure ulceration, left heel - NPUAP stage IV  5. Pressure injury, right heel - NPUAP stage I  6. Numerous abrasions of numerous stages of healing b/l LE  7. Cellulitis, right second digit - clinically improved      -Patient seen and examined at the bedside this am  -VSS, no leukocytosis noted (WBC 10.8)  -ESR 52 and CRP 27.3  -lactic acid 1.3  -prealbumin 15.4, wound healing supplements ordered  -Blood cultures 1 & 2 NGTD  -Xr images reviewed, impression noted above  -Arterial duplex reviewed, impression noted above  -Wound culture not obtained at this time 2/2 no active drainage  -Continue IV antibiotics per IM  -Vascular surgery following, intervention pending further evaluation  -b/l LE dressed with left mepilex heel border and right 2nd digit bandage  -Nursing communication placed for daily dressing changes consisting of the above  -Podiatry to see on Mondays and Fridays  -Prevalon boots ordered. Patient is to wear at all times while in bed to prevent further deep tissue injury.   -NON weightbearing to left LE, full weightbearing as tolerated to the right LE        DISPO: Numerous ulcerations to bilateral lower extremities with multiple stages of healing; nursing communication placed for daily dressing changes, podiatry to follow peripherally and provide local wound care on Monday and Friday      Patient seen and evaluated at the bedside with Dr. Moreno, DPM  Podiatric Resident PGY3  Pager [phone] or Stephen  3/15/2022, 8:19 AM

## 2022-03-16 VITALS
HEART RATE: 64 BPM | RESPIRATION RATE: 16 BRPM | TEMPERATURE: 98 F | DIASTOLIC BLOOD PRESSURE: 82 MMHG | BODY MASS INDEX: 22.02 KG/M2 | HEIGHT: 67 IN | WEIGHT: 140.3 LBS | OXYGEN SATURATION: 92 % | SYSTOLIC BLOOD PRESSURE: 158 MMHG

## 2022-03-16 LAB
ALBUMIN SERPL-MCNC: 3.5 G/DL (ref 3.4–5)
ANION GAP SERPL CALCULATED.3IONS-SCNC: 12 MMOL/L (ref 3–16)
ANION GAP SERPL CALCULATED.3IONS-SCNC: 9 MMOL/L (ref 3–16)
BASOPHILS ABSOLUTE: 0.1 K/UL (ref 0–0.2)
BASOPHILS RELATIVE PERCENT: 0.4 %
BUN BLDV-MCNC: 8 MG/DL (ref 7–20)
BUN BLDV-MCNC: 9 MG/DL (ref 7–20)
CALCIUM SERPL-MCNC: 9.5 MG/DL (ref 8.3–10.6)
CALCIUM SERPL-MCNC: 9.6 MG/DL (ref 8.3–10.6)
CHLORIDE BLD-SCNC: 103 MMOL/L (ref 99–110)
CHLORIDE BLD-SCNC: 103 MMOL/L (ref 99–110)
CO2: 25 MMOL/L (ref 21–32)
CO2: 29 MMOL/L (ref 21–32)
CREAT SERPL-MCNC: <0.5 MG/DL (ref 0.8–1.3)
CREAT SERPL-MCNC: <0.5 MG/DL (ref 0.8–1.3)
EOSINOPHILS ABSOLUTE: 0.5 K/UL (ref 0–0.6)
EOSINOPHILS RELATIVE PERCENT: 3.5 %
GFR AFRICAN AMERICAN: >60
GFR AFRICAN AMERICAN: >60
GFR NON-AFRICAN AMERICAN: >60
GFR NON-AFRICAN AMERICAN: >60
GLUCOSE BLD-MCNC: 92 MG/DL (ref 70–99)
GLUCOSE BLD-MCNC: 94 MG/DL (ref 70–99)
HCT VFR BLD CALC: 32.6 % (ref 40.5–52.5)
HEMOGLOBIN: 11.1 G/DL (ref 13.5–17.5)
LYMPHOCYTES ABSOLUTE: 1.4 K/UL (ref 1–5.1)
LYMPHOCYTES RELATIVE PERCENT: 10.5 %
MAGNESIUM: 1.9 MG/DL (ref 1.8–2.4)
MCH RBC QN AUTO: 32.8 PG (ref 26–34)
MCHC RBC AUTO-ENTMCNC: 34.1 G/DL (ref 31–36)
MCV RBC AUTO: 96.2 FL (ref 80–100)
MONOCYTES ABSOLUTE: 1 K/UL (ref 0–1.3)
MONOCYTES RELATIVE PERCENT: 8 %
NEUTROPHILS ABSOLUTE: 10 K/UL (ref 1.7–7.7)
NEUTROPHILS RELATIVE PERCENT: 77.6 %
PDW BLD-RTO: 13.1 % (ref 12.4–15.4)
PHOSPHORUS: 4.5 MG/DL (ref 2.5–4.9)
PLATELET # BLD: 328 K/UL (ref 135–450)
PMV BLD AUTO: 7.1 FL (ref 5–10.5)
POTASSIUM REFLEX MAGNESIUM: 4.4 MMOL/L (ref 3.5–5.1)
POTASSIUM SERPL-SCNC: 4.3 MMOL/L (ref 3.5–5.1)
RBC # BLD: 3.39 M/UL (ref 4.2–5.9)
SODIUM BLD-SCNC: 140 MMOL/L (ref 136–145)
SODIUM BLD-SCNC: 141 MMOL/L (ref 136–145)
WBC # BLD: 12.9 K/UL (ref 4–11)

## 2022-03-16 PROCEDURE — 2580000003 HC RX 258: Performed by: STUDENT IN AN ORGANIZED HEALTH CARE EDUCATION/TRAINING PROGRAM

## 2022-03-16 PROCEDURE — 80069 RENAL FUNCTION PANEL: CPT

## 2022-03-16 PROCEDURE — 6370000000 HC RX 637 (ALT 250 FOR IP)

## 2022-03-16 PROCEDURE — 83735 ASSAY OF MAGNESIUM: CPT

## 2022-03-16 PROCEDURE — 6360000002 HC RX W HCPCS

## 2022-03-16 PROCEDURE — 6370000000 HC RX 637 (ALT 250 FOR IP): Performed by: STUDENT IN AN ORGANIZED HEALTH CARE EDUCATION/TRAINING PROGRAM

## 2022-03-16 PROCEDURE — 2580000003 HC RX 258

## 2022-03-16 PROCEDURE — 36415 COLL VENOUS BLD VENIPUNCTURE: CPT

## 2022-03-16 PROCEDURE — 85025 COMPLETE CBC W/AUTO DIFF WBC: CPT

## 2022-03-16 PROCEDURE — 6360000002 HC RX W HCPCS: Performed by: STUDENT IN AN ORGANIZED HEALTH CARE EDUCATION/TRAINING PROGRAM

## 2022-03-16 RX ORDER — ERGOCALCIFEROL 1.25 MG/1
50000 CAPSULE ORAL WEEKLY
Qty: 5 CAPSULE | Refills: 1 | Status: SHIPPED | OUTPATIENT
Start: 2022-03-22

## 2022-03-16 RX ORDER — CEPHALEXIN 500 MG/1
500 CAPSULE ORAL 4 TIMES DAILY
Qty: 28 CAPSULE | Refills: 0 | Status: SHIPPED | OUTPATIENT
Start: 2022-03-16 | End: 2022-03-23

## 2022-03-16 RX ADMIN — THERA TABS 1 TABLET: TAB at 09:25

## 2022-03-16 RX ADMIN — FOLIC ACID 1 MG: 1 TABLET ORAL at 09:26

## 2022-03-16 RX ADMIN — TAMSULOSIN HYDROCHLORIDE 0.8 MG: 0.4 CAPSULE ORAL at 09:25

## 2022-03-16 RX ADMIN — VANCOMYCIN HYDROCHLORIDE 1250 MG: 10 INJECTION, POWDER, LYOPHILIZED, FOR SOLUTION INTRAVENOUS at 12:18

## 2022-03-16 RX ADMIN — VANCOMYCIN HYDROCHLORIDE 1250 MG: 10 INJECTION, POWDER, LYOPHILIZED, FOR SOLUTION INTRAVENOUS at 02:22

## 2022-03-16 RX ADMIN — ASPIRIN 81 MG: 81 TABLET, CHEWABLE ORAL at 09:25

## 2022-03-16 RX ADMIN — OXYCODONE HYDROCHLORIDE 2.5 MG: 5 TABLET ORAL at 09:25

## 2022-03-16 RX ADMIN — SODIUM CHLORIDE: 9 INJECTION, SOLUTION INTRAVENOUS at 01:19

## 2022-03-16 RX ADMIN — PAROXETINE HYDROCHLORIDE 40 MG: 20 TABLET, FILM COATED ORAL at 09:25

## 2022-03-16 RX ADMIN — CEFAZOLIN SODIUM 2000 MG: 10 INJECTION, POWDER, FOR SOLUTION INTRAVENOUS at 11:33

## 2022-03-16 RX ADMIN — Medication 5 MG: at 00:34

## 2022-03-16 RX ADMIN — ENOXAPARIN SODIUM 40 MG: 100 INJECTION SUBCUTANEOUS at 09:25

## 2022-03-16 RX ADMIN — THIAMINE HCL TAB 100 MG 100 MG: 100 TAB at 09:25

## 2022-03-16 RX ADMIN — CEFAZOLIN SODIUM 2000 MG: 10 INJECTION, POWDER, FOR SOLUTION INTRAVENOUS at 05:45

## 2022-03-16 ASSESSMENT — PAIN SCALES - GENERAL
PAINLEVEL_OUTOF10: 0
PAINLEVEL_OUTOF10: 0
PAINLEVEL_OUTOF10: 6

## 2022-03-16 NOTE — PROGRESS NOTES
Progress Note    Admit Date: 3/13/2022  Day: 4  Diet: ADULT DIET; Regular  ADULT ORAL NUTRITION SUPPLEMENT; Lunch; Wound Healing Oral Supplement    CC: AMS. Interval history:   Patient seen at bedside, no acute events overnight. VS significant for mild hypertension, 's. Other VS stable. He is AOx4. He endorses he feels well this am, denies acute complaints and states he feels that pain is well-controlled. He continues to have Lowe in place, voiding trial attempted day prior however patient unable to pass urine on his own. On ROS, he denies acute complains including fever/chills, n/v, abdominal pain, acute urinary or bowel abnormalities. He denies chest pain, palpitations, SOB. HPI:     Estella Villalobos is a 67 y.o. male with medical history of BPH, TBI, AAA and HLD who presents with altered mental status. Patient was recently admitted to [facility] on 2/14/2022 for a mechanical fall with subsequent subdural hematoma as a result. No neurosurgical intervention occurred. Patient was discharged on 2/19/2022 to SNF where he completed his rehabilitation and was then discharged home with 24 hour care. Patient's physical conditioning and mentation has been slowly improving. Today, patient's son got a call from his caretaker reporting around noon he became confused and was only oriented to himself. The caretaker was concerned that the patient was also developing an urinary tract infection as he had similar symptoms of suprapubic pain, increased urgency and inability to urinate. Patient's son also states that the patient has been started on Paxil for his anxiety and depression as well as Restoril PRN as a sleep aid. According to his caretaker, he has been taking all his prescribed medication.   Patient denies any dysuria, fever, chills, chest pain, shortness of breath, abdominal pain, constipation, diarrhea, recent trauma, falls, cough, or palpitations.     ED course:   Vitals: Hypertensive (186/91), afebrile, saturating 98% on RA  Exam: Left heel ulcer with surrounding erythema, warmth and edema. No fluctuance or drainage. Labs: Leukocytosis (13.8) with left shift, lactic acidosis (2.2), elevated ESR (52) & CRP (27.3)  Imaging: CT head, CXR & LLE XR (-). Bedside ultrasound (-) for abscess. Intervention: Ceftriaxone & vancomycin x1    Medications:     Scheduled Meds:   vitamin D  50,000 Units Oral Weekly    folic acid  1 mg Oral Daily    multivitamin  1 tablet Oral Daily    vitamin B-1  100 mg Oral Daily    sodium chloride flush  5-40 mL IntraVENous 2 times per day    enoxaparin  40 mg SubCUTAneous Daily    atorvastatin  40 mg Oral Nightly    aspirin  81 mg Oral Daily    PARoxetine  40 mg Oral Daily    ceFAZolin  2,000 mg IntraVENous Q8H    vancomycin  1,250 mg IntraVENous Q12H    tamsulosin  0.8 mg Oral Daily    lidocaine  1 patch TransDERmal Daily     Continuous Infusions:   sodium chloride      sodium chloride 100 mL/hr at 03/16/22 0119     PRN Meds:sodium chloride flush, sodium chloride, ondansetron **OR** ondansetron, polyethylene glycol, acetaminophen **OR** acetaminophen, labetalol, loperamide, oxyCODONE, LORazepam    Objective:   Vitals:   T-max:  Patient Vitals for the past 8 hrs:   BP Temp Temp src Pulse Resp SpO2   03/16/22 0717 (!) 150/73 98 °F (36.7 °C) Axillary 58 16 94 %   03/16/22 0320 (!) 152/55 97.7 °F (36.5 °C) Axillary 65 16 94 %       Intake/Output Summary (Last 24 hours) at 3/16/2022 0906  Last data filed at 3/16/2022 0320  Gross per 24 hour   Intake 240 ml   Output 4200 ml   Net -3960 ml       Review of Systems  Per Interval hx. Physical Exam  Constitutional:       Appearance: Normal appearance. HENT:      Head: Normocephalic and atraumatic. Nose: Nose normal.      Mouth/Throat:      Mouth: Mucous membranes are dry. Pharynx: Oropharynx is clear. Eyes:      Extraocular Movements: Extraocular movements intact.       Conjunctiva/sclera: Conjunctivae normal.   Cardiovascular:      Rate and Rhythm: Normal rate and regular rhythm. Pulses: Normal pulses. Heart sounds: Normal heart sounds. Pulmonary:      Effort: Pulmonary effort is normal.      Breath sounds: Normal breath sounds. Abdominal:      General: Bowel sounds are normal.      Palpations: Abdomen is soft. Musculoskeletal:         General: Normal range of motion. Cervical back: Normal range of motion and neck supple. Skin:     Comments: L heel with mild erythema, warmth, tenderness. Neurological:      General: No focal deficit present. Mental Status: He is alert and oriented to person, place, and time. Mental status is at baseline. LABS:    CBC:   Recent Labs     03/14/22 0550 03/15/22  0550 03/16/22  0541   WBC 11.9* 10.8 12.9*   HGB 10.7* 10.4* 11.1*   HCT 31.1* 30.9* 32.6*    305 328   MCV 96.0 96.9 96.2     Renal:    Recent Labs     03/14/22 0550 03/14/22 0550 03/15/22  0550 03/16/22  0541     --  138 141  140   K 4.0   < > 4.1 4.3  4.4     --  103 103  103   CO2 26  --  25 29  25   BUN 7  --  9 9  8   CREATININE <0.5*  --  0.5* <0.5*  <0.5*   GLUCOSE 94  --  101* 94  92   CALCIUM 9.2  --  8.9 9.6  9.5   MG  --   --   --  1.90   PHOS  --   --   --  4.5   ANIONGAP 9  --  10 9  12    < > = values in this interval not displayed. Hepatic:   Recent Labs     03/13/22 1953 03/16/22 0541   AST 36  --    ALT 22  --    BILITOT <0.2  --    BILIDIR <0.2  --    PROT 7.3  --    LABALBU 3.8 3.5   ALKPHOS 106  --      Troponin: No results for input(s): TROPONINI in the last 72 hours. BNP: No results for input(s): BNP in the last 72 hours. Lipids: No results for input(s): CHOL, HDL in the last 72 hours. Invalid input(s): LDLCALCU, TRIGLYCERIDE  ABGs:  No results for input(s): PHART, TNF5ABT, PO2ART, TGS7UBG, BEART, THGBART, W9RQVSTQ, QPE8AZV in the last 72 hours.     INR: No results for input(s): INR in the last 72 hours. Lactate: No results for input(s): LACTATE in the last 72 hours. Cultures:  -----------------------------------------------------------------  RAD:   XR FOOT RIGHT (MIN 3 VIEWS)   Final Result   Right foot:   1. Multifocal skin defects without radiographic evidence of osteomyelitis. Right ankle:   1. Skin defects and soft tissue swelling over the lateral malleolus without radiographic evidence of osteomyelitis. XR ANKLE LEFT (MIN 3 VIEWS)   Final Result   Skin defect on the medial ankle and heel without radiographic evidence of osteomyelitis. XR ANKLE RIGHT (MIN 3 VIEWS)   Final Result   Right foot:   1. Multifocal skin defects without radiographic evidence of osteomyelitis. Right ankle:   1. Skin defects and soft tissue swelling over the lateral malleolus without radiographic evidence of osteomyelitis. XR FOOT LEFT (MIN 3 VIEWS)   Final Result   None. Left foot:      FINDINGS: AP, lateral, and oblique views of the left foot were obtained. Alignment is anatomic. No fracture or dislocation. No bony erosion. Soft tissues are unremarkable. IMPRESSION:   1. Normal radiographs of the left foot. CHEST:      FINDINGS: Portable AP view of the chest was obtained. Lungs are clear. Cardiomediastinal contours are normal. No pleural effusion or pneumothorax. There are 2 remote right-sided rib fractures of the right sixth and seventh ribs. IMPRESSION:   1. No acute findings in the chest.      XR CHEST PORTABLE   Final Result   None. Left foot:      FINDINGS: AP, lateral, and oblique views of the left foot were obtained. Alignment is anatomic. No fracture or dislocation. No bony erosion. Soft tissues are unremarkable. IMPRESSION:   1. Normal radiographs of the left foot. CHEST:      FINDINGS: Portable AP view of the chest was obtained. Lungs are clear. Cardiomediastinal contours are normal. No pleural effusion or pneumothorax.  There are 2 remote right-sided rib fractures of the right sixth and seventh ribs. IMPRESSION:   1. No acute findings in the chest.      CT HEAD WO CONTRAST   Final Result   1. No acute intracranial findings. 2. Previous chronic appearing right frontal subdural hematoma has resolved. Assessment/Plan:     Armida Jensen is a 67 y.o. male who presents with acute metabolic encephalopathy in setting of left lower extremity cellulitis.     Acute Metabolic Encephalopathy 2/2 Urinary Retention, LLE Cellulitis - RESOLVED     Acute Urinary Retention  Patient noted to have 1L of fluid on bladder scan on arrival, Lowe was placed with relief. Attempted voiding trial 3/15 but patient unable to urinate on his own. - Urology c/s - appreciate reccs  - consider pt may be dc with Lowe in place and f/u o/p w/ Uro    Left Lower Extremity Wounds   Mild erythema and tenderness. No appreciable temperature difference, swelling. No fluctuance, drainage; XR L foot clear, US - for abscess formation. Afebrile on arrival; WBC 13.8 (left shift), CRP 27.3, ESR 52. S/p Vanc, CTX in ED.  - continue Vanc, Ancef  - Podiatry following peripherally - daily dressing changes, local wound care on Mon, Fri   - f/u wound cultures    PAD  - Vascular c/s - recc f/u o/p    Physical Deconditioning   - PT/OT    Vitamin D Deficiency  Vit D 13 this admission.   - supplementing, 50k once weekly      Lactic Acidosis - Resolved  Elevated to 2.2 on arrival. S/p 1L LR.     Chronic Issues:  Mixed Anxiety & Depressive Disorder - continue Paxil, holding home Restoril   AAA - AAA measuring 3.9 x 3.6 cm on CT Feb 2022 compared to 3.7 x 3.5 cm in 2019; continue ASA, Lipitor        Code Status: Limited x4  FEN: Adult Diet; General   PPX: Lovenox  DISPO: Caro Holman MD, PGY-1  03/16/22  9:06 AM    This patient has been staffed and discussed with Dr. Adeline Ribeiro MD.

## 2022-03-16 NOTE — CONSULTS
Urology Attending Consult Note      Reason for Consultation: Retention    History: 66 yo M [facility] with acute metabolic encephalopathy 2/2 cellulitis. He has remote history of PCA managed by Dr. Jaden Orona. We have seen him prior for retention issues inpatient and are currently in discussion of next steps for treatment. On presentation to ER he was noted to have 1L of urine on bladder scan and avendaño was placed. Attempted voiding trial yesterday but he failed and catheter was replaced. We were consulted for recs. Family History, Social History, Review of Systems:  Reviewed and agreed to as per chart    Vitals:  BP (!) 150/73   Pulse 58   Temp 98 °F (36.7 °C) (Axillary)   Resp 16   Ht 5' 7\" (1.702 m)   Wt 140 lb 4.8 oz (63.6 kg)   SpO2 94%   BMI 21.97 kg/m²   Temp  Av.4 °F (36.9 °C)  Min: 97.7 °F (36.5 °C)  Max: 100.1 °F (37.8 °C)    Intake/Output Summary (Last 24 hours) at 3/16/2022 0919  Last data filed at 3/16/2022 0320  Gross per 24 hour   Intake 240 ml   Output 4200 ml   Net -3960 ml         Physical:   Well developed, well nourished in no acute distress   Mood indicates no abnormalities. Pt doesnt appear depressed   Orientated to time and place   Neck is supple, trachea is midline   Respiratory effort is normal   Cardiovascular show no extremity swelling   Abdomen no masses or hernias are palpated, there is no tenderness. Liver and Spleen appear normal.   Skin show no abnormal lesions   Lymph nodes are not palpated in the inguinal, neck, or axillary area.      Male :   Avendaño draining clear       Labs:  WBC:    Lab Results   Component Value Date    WBC 12.9 2022     Hemoglobin/Hematocrit:    Lab Results   Component Value Date    HGB 11.1 2022    HCT 32.6 2022     BMP:    Lab Results   Component Value Date     2022     2022    K 4.3 2022    K 4.4 2022     2022     2022    CO2 29 03/16/2022    CO2 25 03/16/2022    BUN 9 03/16/2022    BUN 8 03/16/2022    LABALBU 3.5 03/16/2022    CREATININE <0.5 03/16/2022    CREATININE <0.5 03/16/2022    CALCIUM 9.6 03/16/2022    CALCIUM 9.5 03/16/2022    GFRAA >60 03/16/2022    GFRAA >60 03/16/2022    LABGLOM >60 03/16/2022    LABGLOM >60 03/16/2022     PT/INR:    Lab Results   Component Value Date    PROTIME 13.7 09/29/2021    INR 1.20 09/29/2021     PTT:  No results found for: APTT[APTT    Impression/Plan: 65 yo M with history of remote PCA and known retention issues. We were consulted for recs. -Avendaño in place draining clear  -Continue flomax   -Would recommend keeping avendaño in place and having him FU with us as outpatient for further discussion. Will attempt to arrange with Dr. Ai Romero who is his primary urologist. He understands.         JENNA Joel

## 2022-03-16 NOTE — CARE COORDINATION
CTN contacted Zoe with [employer] [phone]. They have accepted this patient and will pull referral from [facility].  They will contact patient and make arrangements for [facility] by 3/18  Electronically signed by Alex Street LPN on 0/60/3562 at 63:11 AM

## 2022-03-16 NOTE — CARE COORDINATION
Case Management Assessment            Discharge Note                    Date / Time of Note: 3/16/2022 11:09 AM                  Discharge Note Completed by: Estrella Barrientos RN    Patient Name: Juana James   YOB: 1950  Diagnosis: Urinary retention [R33.9]  Cellulitis of left lower extremity [I31.489]  Open wound of heel, left, initial encounter [S91.302A]  Cellulitis and abscess of left lower extremity [L03.116, L02.416]   Date / Time: 3/13/2022  7:04 PM    Current PCP: Randy Mathur MD  Clinic patient: No    Hospitalization in the last 30 days: No    Advance Directives:  Code Status: Limited  [place] DNR form completed and on chart: Not Indicated    Financial:  Payor: Yair Galvez / Plan: Samra Bowling HMO / Product Type: Medicare /      Pharmacy:    [facility] [place] [id number]  Phone: [phone] Fax: [phone]      Assistance purchasing medications?:    Assistance provided by Case Management: None at this time    Does patient want to participate in local refill/ meds to beds program?:      Meds To Beds General Rules:  1. Can ONLY be done Monday- Friday between 8:30am-5pm  2. Prescription(s) must be in pharmacy by 3pm to be filled same day  3. Copy of patient's insurance/ prescription drug card and patient face sheet must be sent along with the prescription(s)  4. Cost of Rx cannot be added to hospital bill. If financial assistance is needed, please contact unit  or ;  or  CANNOT provide pharmacy voucher for patients co-pays  5.  Patients can then  the prescription on their way out of the hospital at discharge, or pharmacy can deliver to the bedside if staff is available. (payment due at time of pick-up or delivery - cash, check, or card accepted)     Able to afford home medications/ co-pay costs: Yes    ADLS:  Current PT AM-PAC Score: 13 /24  Current OT AM-PAC Score: 13 /24      DISCHARGE Disposition: Home with Home Health Care: [facility]     LOC at discharge: Not Applicable  TOM Completed: Not Indicated    Notification completed in HENS/PAS?:  Not Applicable    IMM Completed:   Yes, Case management has presented and reviewed IMM letter #2 to the patient and/or family/ POA. Patient and/or family/POA verbalized understanding of their medicare rights and appeal process if needed. Patient and/or family/POA has signed, initialed and placed today's date (3/16/22) and time (1110) on IMM letter #2 on the the appropriate lines. Patient and/or family/POA, copy of letter offered and they are aware that this original copy of IMM letter #2 is available prior to discharge from the paper chart on the unit. Electronic documentation has been entered into epic for IMM letter #2 and original paper copy has been added to the paper chart at the nurses station. Transportation:  Transportation PLAN for discharge: family   Mode of Transport: [employer] ordered at discharge: Yes  [address]: [employer]  Phone: [phone]  Fax: [phone]  Orders faxed: Yes    Durable Medical Equipment:  DME Provider: none  Equipment obtained during hospitalization:     Home Oxygen and Respiratory Equipment:  Oxygen needed at discharge?: Not [address]: Not Applicable  Portable tank available for discharge?: Not Indicated    Dialysis:  Dialysis patient: No    Dialysis Center:  Not Applicable      Additional CM Notes: Pt from home with 24/7 private care givers from [employer] will pick pt up after work 3-4pm.  Set up with [employer], will follow at ID.       The Plan for Transition of Care is related to the following treatment goals of Urinary retention [R33.9]  Cellulitis of left lower extremity [R65.021]  Open wound of heel, left, initial encounter [S91.302A]  Cellulitis and abscess of left lower extremity [L03.116, L02.416]    The Patient and/or patient representative Ivis Paige and his family were provided with a choice of provider and agrees with the discharge plan Yes    Freedom of choice list was provided with basic dialogue that supports the patient's individualized plan of care/goals and shares the quality data associated with the providers.  Yes    Care Transitions patient: No    Sangeeta Blair RN  [facility]  Case Management Department  Ph: [phone]  Fax: [phone]

## 2022-03-16 NOTE — FLOWSHEET NOTE
03/16/22 1345   Encounter Summary   Services provided to: Patient   Referral/Consult From: Rounding   Continue Visiting   (3/16, sanjay )   Complexity of Encounter Low   Length of Encounter 15 minutes   Routine   Type Initial   Assessment Sleeping   Staff Jose David Kee Mask, [address] [place]

## 2022-03-16 NOTE — DISCHARGE SUMMARY
Discharge Progress Note  3/16/2022 4:08 PM    Data:  Discharge order received. Action:  IV D/C'd without difficulty. See Doc Flowsheets for assessment. Patient given discharge instructions with prescriptions. Response:  Patient verbalized understanding of discharge instructions. Patient left with all belongings to front lobby with son.     Donnie Lanes, RN  ________________________________________________________________________

## 2022-03-16 NOTE — PROGRESS NOTES
Clinical Pharmacy Progress Note    Vancomycin - Management by Pharmacy    Consult Date(s):  3/14/22  Consulting Provider(s):  Dr. Nidia Lyles / Plan  1)  B/L LE chronic ulcers with cellulitis - Vancomycin   Concurrent Antimicrobials: Cefazolin    Day of Vanc Therapy: 3   Current Dosing Method: Bayesian-guided AUC  o Therapeutic Goal: -500 mg/L*hr  o Currently on 1250mg IV q12h. Therapeutic AUC based on level yesterday. Renal function is stable. Continue same regimen.  o Will monitor for plan re: length of treatment. If short course of Vancomycin is planned and renal function remains stable, repeat level may not be needed. Will order repeat level if it becomes appropriate.  Will continue to monitor clinical condition and make adjustments to regimen as appropriate. Please call with any questions. Lowell CochranD  Main Pharmacy: [id number]  3/16/2022 9:51 AM      Interval update:  Vascular surgery recommending follow up outpatient. Urology recommending continuing avendaño and follow up outpatient. Subjective/Objective:   Omid Nolan is a 67 y.o. y/o male with a PMHx that includes BPH, subdural hemorrhage s/p fall, AAA, depression, anxiety, and HLD who is admitted with altered mental status - being treated for B/L LE chronic ulcers with cellulitis and urinary retention. Pharmacy is consulted to dose Vancomycin.     Current antibiotics:   x1 3/13 PM  Cefazolin 2000mg IV q8h (3/14-current)  Vancomycin - Pharmacy to dose   1000mg IV x1 3/13 23:00   1250mg IV q12h (3/14-current)    Ht Readings from Last 1 Encounters:   03/13/22 5' 7\" (1.702 m)     Wt Readings from Last 1 Encounters:   03/13/22 140 lb 4.8 oz (63.6 kg)     Vanc Level(s) / Doses:  Date Time Dose Level / Type of Level Interpretation   3/15 05:50 1250mg q12h Random = 16.6 mcg/mL · Drawn 3 hr after dose infused  · Calculated AUC = 497 with trough = 14.7          Note: Serum levels collected for AUC-based dosing may be high if collected in close proximity to the dose administered. This is not necessarily an indicator of toxicity. Recent Labs     03/14/22  0550 03/15/22  0550 03/16/22  0541   CREATININE <0.5* 0.5* <0.5*  <0.5*   BUN 7 9 9  8   WBC 11.9* 10.8 12.9*     CrCl cannot be calculated (This lab value cannot be used to calculate CrCl because it is not a number: <0.5).     Cultures & Sensitivities:  Date Site Micro Susceptibility / Result   3/13 Blood x2 No growth to date    3/13 Wound Not sent as no active drainage per podiatry

## 2022-03-16 NOTE — PROGRESS NOTES
Patient is alert and oriented x4. VSS. Voiding freely independently in bathroom. IVF infusing in Left AC. Started IV ABX = 2nd peripheral IV started in Left hand for Zosyn. Pt states pain is only when moving/ getting up and down, find when resting. No pain meds needed at this time. No needs at this time. Pt is still due to be transported at 5am, will have pt. Void before leaving. Pt. Is in bed in lowest position, call light and table within reach. Will continue to monitor.

## 2022-03-16 NOTE — PLAN OF CARE
Problem: Skin Integrity:  Goal: Will show no infection signs and symptoms  Description: Will show no infection signs and symptoms  Outcome: Ongoing  Note: No new skin breakdown. Refusing dressing on heels and toe. Problem: Pain:  Goal: Pain level will decrease  Description: Pain level will decrease  Outcome: Ongoing  Note: Given prn pain meds as needed. Resting with eyes closed. Door open.

## 2022-03-18 LAB
BLOOD CULTURE, ROUTINE: NORMAL
CULTURE, BLOOD 2: NORMAL